# Patient Record
Sex: FEMALE | Race: WHITE | NOT HISPANIC OR LATINO | Employment: OTHER | URBAN - NONMETROPOLITAN AREA
[De-identification: names, ages, dates, MRNs, and addresses within clinical notes are randomized per-mention and may not be internally consistent; named-entity substitution may affect disease eponyms.]

---

## 2019-01-03 ENCOUNTER — HOSPITAL ENCOUNTER (EMERGENCY)
Facility: HOSPITAL | Age: 74
Discharge: ANOTHER HOSPITAL | End: 2019-01-04
Attending: FAMILY MEDICINE | Admitting: FAMILY MEDICINE

## 2019-01-03 DIAGNOSIS — D50.0 ANEMIA DUE TO GASTROINTESTINAL BLOOD LOSS: Primary | ICD-10-CM

## 2019-01-03 PROCEDURE — 85610 PROTHROMBIN TIME: CPT | Performed by: FAMILY MEDICINE

## 2019-01-03 PROCEDURE — 85730 THROMBOPLASTIN TIME PARTIAL: CPT | Performed by: FAMILY MEDICINE

## 2019-01-03 PROCEDURE — 99284 EMERGENCY DEPT VISIT MOD MDM: CPT

## 2019-01-03 PROCEDURE — 86900 BLOOD TYPING SEROLOGIC ABO: CPT | Performed by: FAMILY MEDICINE

## 2019-01-03 PROCEDURE — 86850 RBC ANTIBODY SCREEN: CPT | Performed by: FAMILY MEDICINE

## 2019-01-03 PROCEDURE — 86901 BLOOD TYPING SEROLOGIC RH(D): CPT | Performed by: FAMILY MEDICINE

## 2019-01-03 PROCEDURE — 96365 THER/PROPH/DIAG IV INF INIT: CPT

## 2019-01-03 PROCEDURE — 86923 COMPATIBILITY TEST ELECTRIC: CPT

## 2019-01-03 PROCEDURE — 80053 COMPREHEN METABOLIC PANEL: CPT | Performed by: FAMILY MEDICINE

## 2019-01-03 RX ORDER — SODIUM CHLORIDE 0.9 % (FLUSH) 0.9 %
10 SYRINGE (ML) INJECTION AS NEEDED
Status: DISCONTINUED | OUTPATIENT
Start: 2019-01-03 | End: 2019-01-04 | Stop reason: HOSPADM

## 2019-01-04 ENCOUNTER — APPOINTMENT (OUTPATIENT)
Dept: GENERAL RADIOLOGY | Facility: HOSPITAL | Age: 74
End: 2019-01-04

## 2019-01-04 VITALS
BODY MASS INDEX: 25.39 KG/M2 | HEIGHT: 66 IN | WEIGHT: 158 LBS | OXYGEN SATURATION: 98 % | HEART RATE: 88 BPM | TEMPERATURE: 99.1 F | RESPIRATION RATE: 18 BRPM | SYSTOLIC BLOOD PRESSURE: 145 MMHG | DIASTOLIC BLOOD PRESSURE: 59 MMHG

## 2019-01-04 PROBLEM — D50.0 ANEMIA DUE TO GASTROINTESTINAL BLOOD LOSS: Status: ACTIVE | Noted: 2019-01-04

## 2019-01-04 LAB
ABO GROUP BLD: NORMAL
ABO GROUP BLD: NORMAL
ALBUMIN SERPL-MCNC: 4.2 G/DL (ref 3.4–4.8)
ALBUMIN/GLOB SERPL: 1.7 G/DL (ref 1.1–1.8)
ALP SERPL-CCNC: 86 U/L (ref 38–126)
ALT SERPL W P-5'-P-CCNC: 21 U/L (ref 9–52)
ANION GAP SERPL CALCULATED.3IONS-SCNC: 14 MMOL/L (ref 5–15)
ANISOCYTOSIS BLD QL: ABNORMAL
APTT PPP: 26.1 SECONDS (ref 20–40.3)
AST SERPL-CCNC: 24 U/L (ref 14–36)
BILIRUB SERPL-MCNC: 0.4 MG/DL (ref 0.2–1.3)
BLD GP AB SCN SERPL QL: NEGATIVE
BUN BLD-MCNC: 20 MG/DL (ref 7–21)
BUN/CREAT SERPL: 18.7 (ref 7–25)
CALCIUM SPEC-SCNC: 9.1 MG/DL (ref 8.4–10.2)
CHLORIDE SERPL-SCNC: 103 MMOL/L (ref 95–110)
CO2 SERPL-SCNC: 18 MMOL/L (ref 22–31)
CREAT BLD-MCNC: 1.07 MG/DL (ref 0.5–1)
DEPRECATED RDW RBC AUTO: 45.8 FL (ref 36.4–46.3)
EOSINOPHIL # BLD MANUAL: 0.26 10*3/MM3 (ref 0–0.7)
EOSINOPHIL NFR BLD MANUAL: 3 % (ref 0–7)
ERYTHROCYTE [DISTWIDTH] IN BLOOD BY AUTOMATED COUNT: 14.1 % (ref 11.5–14.5)
GFR SERPL CREATININE-BSD FRML MDRD: 51 ML/MIN/1.73 (ref 39–90)
GLOBULIN UR ELPH-MCNC: 2.5 GM/DL (ref 2.3–3.5)
GLUCOSE BLD-MCNC: 158 MG/DL (ref 60–100)
HCT VFR BLD AUTO: 16.1 % (ref 35–45)
HGB BLD-MCNC: 5.2 G/DL (ref 12–15.5)
HOLD SPECIMEN: NORMAL
HOLD SPECIMEN: NORMAL
INR PPP: 1.02 (ref 0.8–1.2)
LYMPHOCYTES # BLD MANUAL: 1.13 10*3/MM3 (ref 0.6–4.2)
LYMPHOCYTES NFR BLD MANUAL: 13 % (ref 10–50)
LYMPHOCYTES NFR BLD MANUAL: 2 % (ref 0–12)
Lab: NORMAL
MCH RBC QN AUTO: 28.7 PG (ref 26.5–34)
MCHC RBC AUTO-ENTMCNC: 32.3 G/DL (ref 31.4–36)
MCV RBC AUTO: 89 FL (ref 80–98)
MONOCYTES # BLD AUTO: 0.17 10*3/MM3 (ref 0–0.9)
NEUTROPHILS # BLD AUTO: 7.16 10*3/MM3 (ref 2–8.6)
NEUTROPHILS NFR BLD MANUAL: 82 % (ref 37–80)
PLATELET # BLD AUTO: 258 10*3/MM3 (ref 150–450)
PMV BLD AUTO: 8.8 FL (ref 8–12)
POLYCHROMASIA BLD QL SMEAR: ABNORMAL
POTASSIUM BLD-SCNC: 4.9 MMOL/L (ref 3.5–5.1)
PROT SERPL-MCNC: 6.7 G/DL (ref 6.3–8.6)
PROTHROMBIN TIME: 13.2 SECONDS (ref 11.1–15.3)
RBC # BLD AUTO: 1.81 10*6/MM3 (ref 3.77–5.16)
RH BLD: POSITIVE
RH BLD: POSITIVE
SMALL PLATELETS BLD QL SMEAR: ADEQUATE
SODIUM BLD-SCNC: 135 MMOL/L (ref 137–145)
T&S EXPIRATION DATE: NORMAL
TARGETS BLD QL SMEAR: ABNORMAL
WBC MORPH BLD: NORMAL
WBC NRBC COR # BLD: 8.73 10*3/MM3 (ref 3.2–9.8)
WHOLE BLOOD HOLD SPECIMEN: NORMAL
WHOLE BLOOD HOLD SPECIMEN: NORMAL

## 2019-01-04 PROCEDURE — 96365 THER/PROPH/DIAG IV INF INIT: CPT

## 2019-01-04 PROCEDURE — 85007 BL SMEAR W/DIFF WBC COUNT: CPT | Performed by: FAMILY MEDICINE

## 2019-01-04 PROCEDURE — 85025 COMPLETE CBC W/AUTO DIFF WBC: CPT | Performed by: FAMILY MEDICINE

## 2019-01-04 PROCEDURE — 36415 COLL VENOUS BLD VENIPUNCTURE: CPT | Performed by: FAMILY MEDICINE

## 2019-01-04 PROCEDURE — 86900 BLOOD TYPING SEROLOGIC ABO: CPT

## 2019-01-04 PROCEDURE — 86901 BLOOD TYPING SEROLOGIC RH(D): CPT

## 2019-01-04 PROCEDURE — 36430 TRANSFUSION BLD/BLD COMPNT: CPT

## 2019-01-04 PROCEDURE — P9016 RBC LEUKOCYTES REDUCED: HCPCS

## 2019-01-04 RX ORDER — CITALOPRAM 40 MG/1
1 TABLET ORAL DAILY
COMMUNITY
Start: 2017-11-08

## 2019-01-04 RX ORDER — FERROUS SULFATE TAB EC 324 MG (65 MG FE EQUIVALENT) 324 (65 FE) MG
325 TABLET DELAYED RESPONSE ORAL DAILY
COMMUNITY
Start: 2019-01-03 | End: 2020-01-04

## 2019-01-04 RX ORDER — BENAZEPRIL HYDROCHLORIDE 40 MG/1
1 TABLET, FILM COATED ORAL EVERY 12 HOURS
COMMUNITY
Start: 2018-11-20 | End: 2022-06-01 | Stop reason: HOSPADM

## 2019-01-04 RX ORDER — PANTOPRAZOLE SODIUM 40 MG/10ML
80 INJECTION, POWDER, LYOPHILIZED, FOR SOLUTION INTRAVENOUS ONCE
Status: COMPLETED | OUTPATIENT
Start: 2019-01-04 | End: 2019-01-04

## 2019-01-04 RX ORDER — ACETAMINOPHEN 500 MG
TABLET ORAL
Status: COMPLETED
Start: 2019-01-04 | End: 2019-01-04

## 2019-01-04 RX ORDER — SIMVASTATIN 80 MG
80 TABLET ORAL DAILY
COMMUNITY
Start: 2013-11-15 | End: 2022-06-01 | Stop reason: HOSPADM

## 2019-01-04 RX ORDER — ALENDRONATE SODIUM 70 MG/1
70 TABLET ORAL WEEKLY
COMMUNITY
Start: 2018-12-19

## 2019-01-04 RX ORDER — SENNOSIDES 8.6 MG
650 CAPSULE ORAL 3 TIMES DAILY PRN
COMMUNITY

## 2019-01-04 RX ADMIN — SODIUM CHLORIDE 8 MG/HR: 900 INJECTION INTRAVENOUS at 03:14

## 2019-01-04 RX ADMIN — ACETAMINOPHEN 1000 MG: 500 TABLET ORAL at 02:09

## 2019-01-04 RX ADMIN — PANTOPRAZOLE SODIUM 80 MG: 40 INJECTION, POWDER, FOR SOLUTION INTRAVENOUS at 03:02

## 2019-01-04 NOTE — ED PROVIDER NOTES
Subjective   73-year-old white female presents to emergency department with a complaint of feeling fatigued and tired for the last couple of weeks.  She states that she felt pretty good prior to this.  Her doctor ordered labs which she had done today.  Her doctor called her and told her that she needed to come to the emergency department because her blood counts were low.  She admits to history of some kind of gastrointestinal ulcer several years ago.  She had a colonoscopy in 2013 by Dr. Ni.  She states that she has been doing well since then.            Review of Systems   Constitutional: Positive for fatigue. Negative for activity change, appetite change, chills, fever and unexpected weight change.   HENT: Negative for nosebleeds, rhinorrhea, sore throat, trouble swallowing and voice change.    Eyes: Negative for photophobia, pain and visual disturbance.   Respiratory: Negative for apnea, cough, chest tightness, shortness of breath, wheezing and stridor.    Cardiovascular: Negative for chest pain, palpitations and leg swelling.   Gastrointestinal: Negative for abdominal distention, abdominal pain, blood in stool, constipation, diarrhea, nausea and vomiting.   Endocrine: Negative for cold intolerance, heat intolerance, polydipsia and polyuria.   Genitourinary: Negative for decreased urine volume, difficulty urinating, dysuria, flank pain, hematuria and urgency.   Musculoskeletal: Negative for arthralgias, myalgias, neck pain and neck stiffness.   Skin: Negative for color change, pallor and rash.   Allergic/Immunologic: Negative for immunocompromised state.   Neurological: Negative for dizziness, seizures, syncope, weakness, light-headedness and numbness.   Hematological: Negative for adenopathy.   Psychiatric/Behavioral: Negative for agitation, confusion, dysphoric mood and suicidal ideas. The patient is not nervous/anxious.        Past Medical History:   Diagnosis Date   • Anemia    • Diabetes mellitus  (CMS/[facility])    • Hyperlipidemia    • Hypertension        No Known Allergies    History reviewed. No pertinent surgical history.    History reviewed. No pertinent family history.    Social History     Socioeconomic History   • Marital status:      Spouse name: Not on file   • Number of children: Not on file   • Years of education: Not on file   • Highest education level: Not on file   Tobacco Use   • Smoking status: Former Smoker   • Smokeless tobacco: Never Used   Substance and Sexual Activity   • Alcohol use: No     Frequency: Never   • Drug use: No   • Sexual activity: Defer           Objective   Physical Exam   Constitutional: She is oriented to person, place, and time. She appears well-developed and well-nourished. No distress.   HENT:   Head: Normocephalic and atraumatic.   Right Ear: External ear normal.   Left Ear: External ear normal.   Mouth/Throat: Oropharynx is clear and moist. No oropharyngeal exudate.   Eyes: Conjunctivae and EOM are normal. Pupils are equal, round, and reactive to light. Right eye exhibits no discharge. Left eye exhibits no discharge. No scleral icterus.   Neck: Neck supple. No JVD present. No tracheal deviation present. No thyromegaly present.   Cardiovascular: Normal rate, regular rhythm, normal heart sounds and intact distal pulses. Exam reveals no friction rub.   No murmur heard.  Pulmonary/Chest: Effort normal and breath sounds normal. No stridor. No respiratory distress. She has no wheezes. She has no rales. She exhibits no tenderness.   Abdominal: Soft. Bowel sounds are normal. She exhibits no distension and no mass. There is no tenderness. There is no rebound and no guarding.   Genitourinary: Rectal exam shows guaiac positive stool.   Musculoskeletal: She exhibits no edema, tenderness or deformity.   Lymphadenopathy:     She has no cervical adenopathy.   Neurological: She is alert and oriented to person, place, and time. No cranial nerve deficit. She exhibits normal muscle  tone. Coordination normal.   Skin: Skin is warm and dry. Capillary refill takes 2 to 3 seconds. No rash noted. She is not diaphoretic. No erythema.   Psychiatric: She has a normal mood and affect. Her behavior is normal. Judgment and thought content normal.   Nursing note and vitals reviewed.      Procedures           ED Course  ED Course as of Jan 04 0238 Fri Jan 04, 2019   0123 Pt placed in Rm 9 and evaluated by me. PE was unremarkable except guiac+ stools. H/H was low and 2 units PRBCs were started in the Er. No GI on call. Pt requests to go to [facility]. Case d/w Dr Hannon who agreed to accept the patient.   [CE]      ED Course User Index  [CE] Pino Brooks,       Labs Reviewed   COMPREHENSIVE METABOLIC PANEL - Abnormal; Notable for the following components:       Result Value    Glucose 158 (*)     Creatinine 1.07 (*)     Sodium 135 (*)     CO2 18.0 (*)     All other components within normal limits    Narrative:     The MDRD GFR formula is only valid for adults with stable renal function between ages 18 and 70.   CBC WITH AUTO DIFFERENTIAL - Abnormal; Notable for the following components:    RBC 1.81 (*)     Hemoglobin 5.2 (*)     Hematocrit 16.1 (*)     All other components within normal limits    Narrative:     Recollected to confirm results.   MANUAL DIFFERENTIAL - Abnormal; Notable for the following components:    Neutrophil % 82.0 (*)     All other components within normal limits   PROTIME-INR - Normal    Narrative:     Therapeutic range for most indications is 2.0-3.0 INR,  or 2.5-3.5 for mechanical heart valves.   APTT - Normal    Narrative:     The recommended Heparin therapeutic range is 68-97 seconds.   RAINBOW DRAW    Narrative:     The following orders were created for panel order [place] Draw.  Procedure                               Abnormality         Status                     ---------                               -----------         ------                     Light Blue  Top[[id number]]                                   Final result               Green Top (Gel)[[id number]]                                  Final result               Lavender Top[[id number]]                                     Final result               Gold Top - SST[[id number]]                                   Final result                 Please view results for these tests on the individual orders.   TYPE AND SCREEN   PREVIOUS HISTORY   ABORH 2ND SPECIMEN VERIFICATION   PREPARE RBC   CBC AND DIFFERENTIAL    Narrative:     The following orders were created for panel order CBC & Differential.  Procedure                               Abnormality         Status                     ---------                               -----------         ------                     Manual Differential[[id number]]          Abnormal            Final result               CBC Auto Differential[[id number]]        Abnormal            Final result                 Please view results for these tests on the individual orders.   LIGHT BLUE TOP   GREEN TOP   LAVENDER TOP   GOLD TOP - SST     No results found.            MDM      Final diagnoses:   Anemia due to gastrointestinal blood loss            Pino Brooks, DO  01/04/19 0130       Pino Brooks, DO  01/04/19 0238

## 2019-01-08 LAB
ABO + RH BLD: NORMAL
ABO + RH BLD: NORMAL
BH BB BLOOD EXPIRATION DATE: NORMAL
BH BB BLOOD EXPIRATION DATE: NORMAL
BH BB BLOOD TYPE BARCODE: 5100
BH BB BLOOD TYPE BARCODE: 5100
BH BB DISPENSE STATUS: NORMAL
BH BB DISPENSE STATUS: NORMAL
BH BB PRODUCT CODE: NORMAL
BH BB PRODUCT CODE: NORMAL
BH BB UNIT NUMBER: NORMAL
BH BB UNIT NUMBER: NORMAL
UNIT  ABO: NORMAL
UNIT  ABO: NORMAL
UNIT  RH: NORMAL
UNIT  RH: NORMAL

## 2019-01-17 ENCOUNTER — ANESTHESIA (OUTPATIENT)
Dept: GASTROENTEROLOGY | Facility: HOSPITAL | Age: 74
End: 2019-01-17

## 2019-01-17 ENCOUNTER — ANESTHESIA EVENT (OUTPATIENT)
Dept: GASTROENTEROLOGY | Facility: HOSPITAL | Age: 74
End: 2019-01-17

## 2019-01-17 ENCOUNTER — HOSPITAL ENCOUNTER (OUTPATIENT)
Facility: HOSPITAL | Age: 74
Setting detail: HOSPITAL OUTPATIENT SURGERY
Discharge: HOME | End: 2019-01-17
Attending: INTERNAL MEDICINE | Admitting: INTERNAL MEDICINE

## 2019-01-17 VITALS
BODY MASS INDEX: 25.39 KG/M2 | DIASTOLIC BLOOD PRESSURE: 50 MMHG | HEART RATE: 61 BPM | RESPIRATION RATE: 18 BRPM | WEIGHT: 158 LBS | SYSTOLIC BLOOD PRESSURE: 110 MMHG | OXYGEN SATURATION: 95 % | TEMPERATURE: 98.5 F | HEIGHT: 66 IN

## 2019-01-17 DIAGNOSIS — D50.0 BLOOD LOSS ANEMIA: ICD-10-CM

## 2019-01-17 LAB — GLUCOSE BLDC GLUCOMTR-MCNC: 154 MG/DL (ref 70–130)

## 2019-01-17 PROCEDURE — 25010000002 PROPOFOL 10 MG/ML EMULSION: Performed by: NURSE ANESTHETIST, CERTIFIED REGISTERED

## 2019-01-17 PROCEDURE — 88305 TISSUE EXAM BY PATHOLOGIST: CPT | Performed by: INTERNAL MEDICINE

## 2019-01-17 PROCEDURE — 82962 GLUCOSE BLOOD TEST: CPT

## 2019-01-17 PROCEDURE — 88305 TISSUE EXAM BY PATHOLOGIST: CPT | Performed by: PATHOLOGY

## 2019-01-17 PROCEDURE — 25010000002 FENTANYL CITRATE (PF) 100 MCG/2ML SOLUTION: Performed by: NURSE ANESTHETIST, CERTIFIED REGISTERED

## 2019-01-17 RX ORDER — DEXTROSE AND SODIUM CHLORIDE 5; .45 G/100ML; G/100ML
30 INJECTION, SOLUTION INTRAVENOUS CONTINUOUS
Status: DISCONTINUED | OUTPATIENT
Start: 2019-01-17 | End: 2019-01-17 | Stop reason: HOSPADM

## 2019-01-17 RX ORDER — PROPOFOL 10 MG/ML
VIAL (ML) INTRAVENOUS AS NEEDED
Status: DISCONTINUED | OUTPATIENT
Start: 2019-01-17 | End: 2019-01-17 | Stop reason: SURG

## 2019-01-17 RX ORDER — FENTANYL CITRATE 50 UG/ML
INJECTION, SOLUTION INTRAMUSCULAR; INTRAVENOUS AS NEEDED
Status: DISCONTINUED | OUTPATIENT
Start: 2019-01-17 | End: 2019-01-17 | Stop reason: SURG

## 2019-01-17 RX ADMIN — DEXTROSE AND SODIUM CHLORIDE 30 ML/HR: 5; 450 INJECTION, SOLUTION INTRAVENOUS at 08:51

## 2019-01-17 RX ADMIN — FENTANYL CITRATE 50 MCG: 50 INJECTION, SOLUTION INTRAMUSCULAR; INTRAVENOUS at 10:13

## 2019-01-17 RX ADMIN — PROPOFOL 30 MG: 10 INJECTION, EMULSION INTRAVENOUS at 10:13

## 2019-01-17 RX ADMIN — PROPOFOL 20 MG: 10 INJECTION, EMULSION INTRAVENOUS at 10:08

## 2019-01-17 RX ADMIN — FENTANYL CITRATE 50 MCG: 50 INJECTION, SOLUTION INTRAMUSCULAR; INTRAVENOUS at 10:02

## 2019-01-17 RX ADMIN — PROPOFOL 20 MG: 10 INJECTION, EMULSION INTRAVENOUS at 10:05

## 2019-01-17 RX ADMIN — PROPOFOL 60 MG: 10 INJECTION, EMULSION INTRAVENOUS at 10:03

## 2019-01-17 NOTE — H&P
Evelyn Gallagher DO,[facility]  Gastroenterology  Hepatology  Endoscopy  Board Certified in Internal Medicine and gastroenterology  [address], [address]  [place]. [address]  - [phone]   F - [phone]     GASTROENTEROLOGY HISTORY AND PHYSICAL  NOTE   EVELYN GALLAGHER DO.         SUBJECTIVE:   1/17/2019    Name: Shivani Galvez  DOD: 1945        Chief Complaint:       Subjective : Screening for colon cancer.  Iron deficiency anemia.  EGD and small bowel capsule normal apparently.    Patient is 73 y.o. female presents with desire for elective colonoscopy.      ROS/HISTORY/ CURRENT MEDICATIONS/OBJECTIVE/VS/PE:   Review of Systems:  All systems unremarkable unless specified below.  Constitutional   HENT  Eyes   Respiratory    Cardiovascular  Gastrointestinal   Endocrine  Genitourinary    Musculoskeletal   Skin  Allergic/Immunologic    Neurological    Hematological  Psychiatric/Behavioral    History:     Past Medical History:   Diagnosis Date   • Anemia    • Diabetes mellitus (CMS/HCC)    • History of transfusion    • Hyperlipidemia    • Hypertension    • TIA (transient ischemic attack)      Past Surgical History:   Procedure Laterality Date   • TUBAL ABDOMINAL LIGATION       History reviewed. No pertinent family history.  Social History     Tobacco Use   • Smoking status: Former Smoker   • Smokeless tobacco: Never Used   Substance Use Topics   • Alcohol use: No     Frequency: Never   • Drug use: No     Medications Prior to Admission   Medication Sig Dispense Refill Last Dose   • acetaminophen (TYLENOL) 650 MG 8 hr tablet Take 650 mg by mouth 3 (Three) Times a Day As Needed.   1/16/2019 at Unknown time   • benazepril (LOTENSIN) 40 MG tablet Take 1 tablet by mouth Every 12 (Twelve) Hours.   1/16/2019 at Unknown time   • calcium citrate-vitamin D (CITRACAL+D) 315-200 MG-UNIT per tablet Take 1 tablet by mouth 2 (Two) Times a Day.   1/16/2019 at Unknown time   • citalopram (CeleXA) 40 MG tablet  Take 1 tablet by mouth Daily.   1/16/2019 at Unknown time   • ferrous sulfate 324 (65 Fe) MG tablet delayed-release EC tablet Take 325 mg by mouth Daily.   1/16/2019 at Unknown time   • Lancet Devices (ACCU-CHEK SOFTCLIX) lancets 1 Units.   1/16/2019 at Unknown time   • metFORMIN (GLUCOPHAGE) 500 MG tablet Take 1,000 mg by mouth 2 (Two) Times a Day.   1/16/2019 at Unknown time   • simvastatin (ZOCOR) 80 MG tablet Take 80 mg by mouth Daily.   1/16/2019 at Unknown time   • SITagliptin (JANUVIA) 100 MG tablet Take 100 mg by mouth Daily.   1/16/2019 at Unknown time   • alendronate (FOSAMAX) 70 MG tablet Take 70 mg by mouth 1 (One) Time Per Week.   1/12/2019     Allergies:  Patient has no known allergies.    I have reviewed the patients medical history, surgical history and family history in the available medical record system.     Current Medications:     Current Facility-Administered Medications   Medication Dose Route Frequency Provider Last Rate Last Dose   • dextrose 5 % and sodium chloride 0.45 % infusion  30 mL/hr Intravenous Continuous Lucio Ni DO 30 mL/hr at 01/17/19 0851 30 mL/hr at 01/17/19 0851       Objective     Physical Exam:   Temp:  [97.3 °F (36.3 °C)] 97.3 °F (36.3 °C)  Heart Rate:  [75] 75  Resp:  [18] 18    Physical Exam:  General Appearance:    Alert, cooperative, in no acute distress   Head:    Normocephalic, without obvious abnormality, atraumatic   Eyes:            Lids and lashes normal, conjunctivae and sclerae normal, no   icterus, no pallor, corneas clear, PERRLA   Ears:    Ears appear intact with no abnormalities noted   Throat:   No oral lesions, no thrush, oral mucosa moist   Neck:   No adenopathy, supple, trachea midline, no thyromegaly, no     carotid bruit, no JVD   Back:     No kyphosis present, no scoliosis present, no skin lesions,       erythema or scars, no tenderness to percussion or                   palpation,   range of motion normal   Lungs:     Clear to  auscultation,respirations regular, even and                   unlabored    Heart:    Regular rhythm and normal rate, normal S1 and S2, no            murmur, no gallop, no rub, no click   Breast Exam:    Deferred   Abdomen:     Normal bowel sounds, no masses, no organomegaly, soft        non-tender, non-distended, no guarding, no rebound                 tenderness   Genitalia:    Deferred   Extremities:   Moves all extremities well, no edema, no cyanosis, no              redness   Pulses:   Pulses palpable and equal bilaterally   Skin:   No bleeding, bruising or rash   Lymph nodes:   No palpable adenopathy   Neurologic:   Cranial nerves 2 - 12 grossly intact, sensation intact, DTR        present and equal bilaterally      Results Review:     Lab Results   Component Value Date    WBC 8.73 01/04/2019    HGB 5.2 (C) 01/04/2019    HCT 16.1 (L) 01/04/2019     01/04/2019             No results found for: LIPASE  Lab Results   Component Value Date    INR 1.02 01/03/2019          Radiology Review:  Imaging Results (last 72 hours)     ** No results found for the last 72 hours. **           I reviewed the patient's new clinical results.  I reviewed the patient's new imaging results and agree with the interpretation.     ASSESSMENT/PLAN:   ASSESSMENT:   1.  Screening for colon cancer  2.  Iron deficiency anemia    PLAN:   1.  Colonoscopy    Risk and benefits associated with the procedure are reviewed with the patient.  The patient wishes to proceed      Lucio Ni DO  01/17/19  9:38 AM

## 2019-01-17 NOTE — ANESTHESIA POSTPROCEDURE EVALUATION
Patient: Shivani Galvez    Procedure Summary     Date:  01/17/19 Room / Location:  [facility] ENDOSCOPY 2 / [facility] ENDOSCOPY    Anesthesia Start:  0959 Anesthesia Stop:  1019    Procedure:  COLONOSCOPY (N/A ) Diagnosis:       Blood loss anemia      (Blood loss anemia [D50.0])    Surgeon:  Lucio Ni DO Provider:  Demar Carrera CRNA    Anesthesia Type:  MAC ASA Status:  3          Anesthesia Type: MAC  Last vitals  BP       Temp   97.3 °F (36.3 °C) (01/17/19 0838)   Pulse   75 (01/17/19 0838)   Resp   18 (01/17/19 0838)     SpO2   99 % (01/17/19 0838)     Post Anesthesia Care and Evaluation    Patient location during evaluation: bedside  Patient participation: complete - patient participated  Level of consciousness: awake and awake and alert  Pain score: 0  Pain management: satisfactory to patient  Airway patency: patent  Anesthetic complications: No anesthetic complications  PONV Status: none  Cardiovascular status: acceptable and stable  Respiratory status: acceptable, room air and spontaneous ventilation  Hydration status: acceptable

## 2019-01-17 NOTE — ANESTHESIA PREPROCEDURE EVALUATION
Anesthesia Evaluation     Patient summary reviewed and Nursing notes reviewed   NPO Solid Status: > 8 hours  NPO Liquid Status: > 2 hours           Airway   Mallampati: II  TM distance: >3 FB  Neck ROM: full  No difficulty expected  Dental    (+) upper dentures and lower dentures    Pulmonary - normal exam   (+) a smoker Former,   Cardiovascular - normal exam    (+) hypertension, hyperlipidemia,       Neuro/Psych  (+) TIA,     GI/Hepatic/Renal/Endo    (+)  GI bleeding, diabetes mellitus,     Musculoskeletal (-) negative ROS    Abdominal  - normal exam   Substance History - negative use     OB/GYN negative ob/gyn ROS   (-)  Pregnant        Other          Other Comment: Anemia, last reported HGB 5.2                Anesthesia Plan    ASA 3     MAC     intravenous induction   Anesthetic plan, all risks, benefits, and alternatives have been provided, discussed and informed consent has been obtained with: patient.

## 2019-01-18 LAB
LAB AP CASE REPORT: NORMAL
PATH REPORT.FINAL DX SPEC: NORMAL
PATH REPORT.GROSS SPEC: NORMAL

## 2021-01-13 ENCOUNTER — IMMUNIZATION (OUTPATIENT)
Dept: VACCINE CLINIC | Facility: HOSPITAL | Age: 76
End: 2021-01-13

## 2021-01-13 PROCEDURE — 0001A: CPT | Performed by: THORACIC SURGERY (CARDIOTHORACIC VASCULAR SURGERY)

## 2021-01-13 PROCEDURE — 91300 HC SARSCOV02 VAC 30MCG/0.3ML IM: CPT | Performed by: THORACIC SURGERY (CARDIOTHORACIC VASCULAR SURGERY)

## 2021-02-03 ENCOUNTER — IMMUNIZATION (OUTPATIENT)
Dept: VACCINE CLINIC | Facility: HOSPITAL | Age: 76
End: 2021-02-03

## 2021-02-03 PROCEDURE — 91300 HC SARSCOV02 VAC 30MCG/0.3ML IM: CPT | Performed by: THORACIC SURGERY (CARDIOTHORACIC VASCULAR SURGERY)

## 2021-02-03 PROCEDURE — 0002A: CPT | Performed by: THORACIC SURGERY (CARDIOTHORACIC VASCULAR SURGERY)

## 2022-05-27 ENCOUNTER — TRANSCRIBE ORDERS (OUTPATIENT)
Dept: ORTHOPEDIC SURGERY | Facility: CLINIC | Age: 77
End: 2022-05-27

## 2022-05-27 DIAGNOSIS — M25.552 LEFT HIP PAIN: Primary | ICD-10-CM

## 2022-05-27 DIAGNOSIS — M25.562 ACUTE PAIN OF LEFT KNEE: ICD-10-CM

## 2022-05-30 ENCOUNTER — HOSPITAL ENCOUNTER (OUTPATIENT)
Facility: HOSPITAL | Age: 77
Setting detail: OBSERVATION
Discharge: HOME | End: 2022-06-01
Attending: FAMILY MEDICINE | Admitting: INTERNAL MEDICINE

## 2022-05-30 ENCOUNTER — APPOINTMENT (OUTPATIENT)
Dept: GENERAL RADIOLOGY | Facility: HOSPITAL | Age: 77
End: 2022-05-30

## 2022-05-30 ENCOUNTER — APPOINTMENT (OUTPATIENT)
Dept: CT IMAGING | Facility: HOSPITAL | Age: 77
End: 2022-05-30

## 2022-05-30 DIAGNOSIS — I48.91 ATRIAL FIBRILLATION, UNSPECIFIED TYPE: ICD-10-CM

## 2022-05-30 DIAGNOSIS — Z78.9 IMPAIRED MOBILITY AND ACTIVITIES OF DAILY LIVING: ICD-10-CM

## 2022-05-30 DIAGNOSIS — I10 BENIGN HYPERTENSION: ICD-10-CM

## 2022-05-30 DIAGNOSIS — I48.91 ATRIAL FIBRILLATION WITH RVR: Primary | ICD-10-CM

## 2022-05-30 DIAGNOSIS — Z74.09 IMPAIRED MOBILITY AND ACTIVITIES OF DAILY LIVING: ICD-10-CM

## 2022-05-30 DIAGNOSIS — S76.012A STRAIN OF LEFT HIP, INITIAL ENCOUNTER: ICD-10-CM

## 2022-05-30 DIAGNOSIS — M25.552 LEFT HIP PAIN: ICD-10-CM

## 2022-05-30 DIAGNOSIS — I11.9 BENIGN HYPERTENSIVE HEART DISEASE WITHOUT HEART FAILURE: ICD-10-CM

## 2022-05-30 LAB
ALBUMIN SERPL-MCNC: 4.5 G/DL (ref 3.5–5.2)
ALBUMIN/GLOB SERPL: 1.4 G/DL
ALP SERPL-CCNC: 76 U/L (ref 39–117)
ALT SERPL W P-5'-P-CCNC: 16 U/L (ref 1–33)
ANION GAP SERPL CALCULATED.3IONS-SCNC: 17 MMOL/L (ref 5–15)
AST SERPL-CCNC: 18 U/L (ref 1–32)
BASOPHILS # BLD AUTO: 0.11 10*3/MM3 (ref 0–0.2)
BASOPHILS NFR BLD AUTO: 0.9 % (ref 0–1.5)
BILIRUB SERPL-MCNC: 0.5 MG/DL (ref 0–1.2)
BUN SERPL-MCNC: 27 MG/DL (ref 8–23)
BUN/CREAT SERPL: 21.4 (ref 7–25)
CALCIUM SPEC-SCNC: 10.4 MG/DL (ref 8.6–10.5)
CHLORIDE SERPL-SCNC: 99 MMOL/L (ref 98–107)
CK SERPL-CCNC: 64 U/L (ref 20–180)
CO2 SERPL-SCNC: 15 MMOL/L (ref 22–29)
CREAT SERPL-MCNC: 1.26 MG/DL (ref 0.57–1)
DEPRECATED RDW RBC AUTO: 58.7 FL (ref 37–54)
EGFRCR SERPLBLD CKD-EPI 2021: 44.6 ML/MIN/1.73
EOSINOPHIL # BLD AUTO: 0.06 10*3/MM3 (ref 0–0.4)
EOSINOPHIL NFR BLD AUTO: 0.5 % (ref 0.3–6.2)
ERYTHROCYTE [DISTWIDTH] IN BLOOD BY AUTOMATED COUNT: 22.7 % (ref 12.3–15.4)
FLUAV SUBTYP SPEC NAA+PROBE: NOT DETECTED
FLUBV RNA ISLT QL NAA+PROBE: NOT DETECTED
GLOBULIN UR ELPH-MCNC: 3.3 GM/DL
GLUCOSE SERPL-MCNC: 280 MG/DL (ref 65–99)
HCT VFR BLD AUTO: 39.4 % (ref 34–46.6)
HGB BLD-MCNC: 11.8 G/DL (ref 12–15.9)
HOLD SPECIMEN: NORMAL
IMM GRANULOCYTES # BLD AUTO: 0.09 10*3/MM3 (ref 0–0.05)
IMM GRANULOCYTES NFR BLD AUTO: 0.7 % (ref 0–0.5)
INR PPP: 0.99 (ref 0.8–1.2)
LYMPHOCYTES # BLD AUTO: 2.62 10*3/MM3 (ref 0.7–3.1)
LYMPHOCYTES NFR BLD AUTO: 20.9 % (ref 19.6–45.3)
MAGNESIUM SERPL-MCNC: 1.8 MG/DL (ref 1.6–2.4)
MCH RBC QN AUTO: 22.4 PG (ref 26.6–33)
MCHC RBC AUTO-ENTMCNC: 29.9 G/DL (ref 31.5–35.7)
MCV RBC AUTO: 74.8 FL (ref 79–97)
MONOCYTES # BLD AUTO: 0.69 10*3/MM3 (ref 0.1–0.9)
MONOCYTES NFR BLD AUTO: 5.5 % (ref 5–12)
NEUTROPHILS NFR BLD AUTO: 71.5 % (ref 42.7–76)
NEUTROPHILS NFR BLD AUTO: 8.97 10*3/MM3 (ref 1.7–7)
NRBC BLD AUTO-RTO: 0 /100 WBC (ref 0–0.2)
NT-PROBNP SERPL-MCNC: 1031 PG/ML (ref 0–1800)
PLATELET # BLD AUTO: 491 10*3/MM3 (ref 140–450)
PMV BLD AUTO: 9.1 FL (ref 6–12)
POTASSIUM SERPL-SCNC: 4.9 MMOL/L (ref 3.5–5.2)
PROT SERPL-MCNC: 7.8 G/DL (ref 6–8.5)
PROTHROMBIN TIME: 12.9 SECONDS (ref 11.1–15.3)
QT INTERVAL: 302 MS
QTC INTERVAL: 464 MS
RBC # BLD AUTO: 5.27 10*6/MM3 (ref 3.77–5.28)
SARS-COV-2 RNA PNL SPEC NAA+PROBE: NOT DETECTED
SODIUM SERPL-SCNC: 131 MMOL/L (ref 136–145)
TROPONIN T SERPL-MCNC: <0.01 NG/ML (ref 0–0.03)
WBC NRBC COR # BLD: 12.54 10*3/MM3 (ref 3.4–10.8)
WHOLE BLOOD HOLD COAG: NORMAL

## 2022-05-30 PROCEDURE — G0378 HOSPITAL OBSERVATION PER HR: HCPCS

## 2022-05-30 PROCEDURE — 63710000001 INSULIN ASPART PER 5 UNITS: Performed by: INTERNAL MEDICINE

## 2022-05-30 PROCEDURE — 25010000002 MORPHINE PER 10 MG: Performed by: INTERNAL MEDICINE

## 2022-05-30 PROCEDURE — 96361 HYDRATE IV INFUSION ADD-ON: CPT

## 2022-05-30 PROCEDURE — 83880 ASSAY OF NATRIURETIC PEPTIDE: CPT | Performed by: FAMILY MEDICINE

## 2022-05-30 PROCEDURE — 96375 TX/PRO/DX INJ NEW DRUG ADDON: CPT

## 2022-05-30 PROCEDURE — 93005 ELECTROCARDIOGRAM TRACING: CPT | Performed by: FAMILY MEDICINE

## 2022-05-30 PROCEDURE — 25010000002 ENOXAPARIN PER 10 MG: Performed by: INTERNAL MEDICINE

## 2022-05-30 PROCEDURE — 96376 TX/PRO/DX INJ SAME DRUG ADON: CPT

## 2022-05-30 PROCEDURE — 93005 ELECTROCARDIOGRAM TRACING: CPT

## 2022-05-30 PROCEDURE — 96372 THER/PROPH/DIAG INJ SC/IM: CPT

## 2022-05-30 PROCEDURE — C9803 HOPD COVID-19 SPEC COLLECT: HCPCS

## 2022-05-30 PROCEDURE — 36415 COLL VENOUS BLD VENIPUNCTURE: CPT

## 2022-05-30 PROCEDURE — 80053 COMPREHEN METABOLIC PANEL: CPT | Performed by: FAMILY MEDICINE

## 2022-05-30 PROCEDURE — 83735 ASSAY OF MAGNESIUM: CPT | Performed by: FAMILY MEDICINE

## 2022-05-30 PROCEDURE — 96366 THER/PROPH/DIAG IV INF ADDON: CPT

## 2022-05-30 PROCEDURE — 82550 ASSAY OF CK (CPK): CPT | Performed by: FAMILY MEDICINE

## 2022-05-30 PROCEDURE — 87636 SARSCOV2 & INF A&B AMP PRB: CPT | Performed by: FAMILY MEDICINE

## 2022-05-30 PROCEDURE — 96365 THER/PROPH/DIAG IV INF INIT: CPT

## 2022-05-30 PROCEDURE — 93010 ELECTROCARDIOGRAM REPORT: CPT | Performed by: INTERNAL MEDICINE

## 2022-05-30 PROCEDURE — 82962 GLUCOSE BLOOD TEST: CPT

## 2022-05-30 PROCEDURE — 84484 ASSAY OF TROPONIN QUANT: CPT | Performed by: FAMILY MEDICINE

## 2022-05-30 PROCEDURE — 99284 EMERGENCY DEPT VISIT MOD MDM: CPT

## 2022-05-30 PROCEDURE — 25010000002 MORPHINE PER 10 MG: Performed by: FAMILY MEDICINE

## 2022-05-30 PROCEDURE — 85610 PROTHROMBIN TIME: CPT | Performed by: FAMILY MEDICINE

## 2022-05-30 PROCEDURE — 71045 X-RAY EXAM CHEST 1 VIEW: CPT

## 2022-05-30 PROCEDURE — 73502 X-RAY EXAM HIP UNI 2-3 VIEWS: CPT

## 2022-05-30 PROCEDURE — 85025 COMPLETE CBC W/AUTO DIFF WBC: CPT | Performed by: FAMILY MEDICINE

## 2022-05-30 RX ORDER — DEXTROSE MONOHYDRATE 25 G/50ML
25 INJECTION, SOLUTION INTRAVENOUS
Status: DISCONTINUED | OUTPATIENT
Start: 2022-05-30 | End: 2022-06-01 | Stop reason: HOSPADM

## 2022-05-30 RX ORDER — INSULIN ASPART 100 [IU]/ML
0-9 INJECTION, SOLUTION INTRAVENOUS; SUBCUTANEOUS
Status: DISCONTINUED | OUTPATIENT
Start: 2022-05-30 | End: 2022-06-01 | Stop reason: HOSPADM

## 2022-05-30 RX ORDER — SODIUM CHLORIDE 9 MG/ML
125 INJECTION, SOLUTION INTRAVENOUS CONTINUOUS
Status: DISCONTINUED | OUTPATIENT
Start: 2022-05-30 | End: 2022-05-31

## 2022-05-30 RX ORDER — ONDANSETRON 2 MG/ML
4 INJECTION INTRAMUSCULAR; INTRAVENOUS EVERY 6 HOURS PRN
Status: DISCONTINUED | OUTPATIENT
Start: 2022-05-30 | End: 2022-06-01 | Stop reason: HOSPADM

## 2022-05-30 RX ORDER — SODIUM CHLORIDE 0.9 % (FLUSH) 0.9 %
10 SYRINGE (ML) INJECTION EVERY 12 HOURS SCHEDULED
Status: DISCONTINUED | OUTPATIENT
Start: 2022-05-30 | End: 2022-06-01 | Stop reason: HOSPADM

## 2022-05-30 RX ORDER — ATORVASTATIN CALCIUM 40 MG/1
40 TABLET, FILM COATED ORAL DAILY
Status: DISCONTINUED | OUTPATIENT
Start: 2022-05-31 | End: 2022-06-01 | Stop reason: HOSPADM

## 2022-05-30 RX ORDER — SODIUM CHLORIDE 0.9 % (FLUSH) 0.9 %
10 SYRINGE (ML) INJECTION AS NEEDED
Status: DISCONTINUED | OUTPATIENT
Start: 2022-05-30 | End: 2022-06-01 | Stop reason: HOSPADM

## 2022-05-30 RX ORDER — NALOXONE HCL 0.4 MG/ML
0.4 VIAL (ML) INJECTION
Status: DISCONTINUED | OUTPATIENT
Start: 2022-05-30 | End: 2022-06-01 | Stop reason: HOSPADM

## 2022-05-30 RX ORDER — MORPHINE SULFATE 2 MG/ML
2 INJECTION, SOLUTION INTRAMUSCULAR; INTRAVENOUS
Status: DISCONTINUED | OUTPATIENT
Start: 2022-05-30 | End: 2022-06-01 | Stop reason: HOSPADM

## 2022-05-30 RX ORDER — DILTIAZEM HYDROCHLORIDE 60 MG/1
60 TABLET, FILM COATED ORAL EVERY 6 HOURS SCHEDULED
Status: DISCONTINUED | OUTPATIENT
Start: 2022-05-30 | End: 2022-06-01

## 2022-05-30 RX ORDER — ENOXAPARIN SODIUM 100 MG/ML
40 INJECTION SUBCUTANEOUS EVERY 24 HOURS
Status: DISCONTINUED | OUTPATIENT
Start: 2022-05-30 | End: 2022-06-01

## 2022-05-30 RX ORDER — NICOTINE POLACRILEX 4 MG
15 LOZENGE BUCCAL
Status: DISCONTINUED | OUTPATIENT
Start: 2022-05-30 | End: 2022-06-01 | Stop reason: HOSPADM

## 2022-05-30 RX ORDER — ACETAMINOPHEN 325 MG/1
650 TABLET ORAL EVERY 4 HOURS PRN
Status: DISCONTINUED | OUTPATIENT
Start: 2022-05-30 | End: 2022-06-01 | Stop reason: HOSPADM

## 2022-05-30 RX ORDER — CITALOPRAM 20 MG/1
20 TABLET ORAL DAILY
Status: DISCONTINUED | OUTPATIENT
Start: 2022-05-31 | End: 2022-06-01 | Stop reason: HOSPADM

## 2022-05-30 RX ADMIN — SODIUM CHLORIDE 500 ML: 9 INJECTION, SOLUTION INTRAVENOUS at 11:39

## 2022-05-30 RX ADMIN — SODIUM CHLORIDE 125 ML/HR: 9 INJECTION, SOLUTION INTRAVENOUS at 11:40

## 2022-05-30 RX ADMIN — MORPHINE SULFATE 2 MG: 2 INJECTION, SOLUTION INTRAMUSCULAR; INTRAVENOUS at 22:09

## 2022-05-30 RX ADMIN — SODIUM CHLORIDE 125 ML/HR: 9 INJECTION, SOLUTION INTRAVENOUS at 17:45

## 2022-05-30 RX ADMIN — MORPHINE SULFATE 4 MG: 4 INJECTION INTRAVENOUS at 12:27

## 2022-05-30 RX ADMIN — INSULIN ASPART 4 UNITS: 100 INJECTION, SOLUTION INTRAVENOUS; SUBCUTANEOUS at 17:02

## 2022-05-30 RX ADMIN — MORPHINE SULFATE 2 MG: 2 INJECTION, SOLUTION INTRAMUSCULAR; INTRAVENOUS at 17:41

## 2022-05-30 RX ADMIN — SODIUM CHLORIDE 5 MG/HR: 900 INJECTION, SOLUTION INTRAVENOUS at 12:25

## 2022-05-30 RX ADMIN — ENOXAPARIN SODIUM 40 MG: 40 INJECTION SUBCUTANEOUS at 17:41

## 2022-05-30 RX ADMIN — DILTIAZEM HYDROCHLORIDE 60 MG: 60 TABLET, FILM COATED ORAL at 17:41

## 2022-05-30 RX ADMIN — SODIUM CHLORIDE 5 MG/HR: 900 INJECTION, SOLUTION INTRAVENOUS at 13:58

## 2022-05-31 ENCOUNTER — APPOINTMENT (OUTPATIENT)
Dept: CT IMAGING | Facility: HOSPITAL | Age: 77
End: 2022-05-31

## 2022-05-31 ENCOUNTER — APPOINTMENT (OUTPATIENT)
Dept: CARDIOLOGY | Facility: HOSPITAL | Age: 77
End: 2022-05-31

## 2022-05-31 LAB
ANION GAP SERPL CALCULATED.3IONS-SCNC: 13 MMOL/L (ref 5–15)
BASOPHILS # BLD AUTO: 0.1 10*3/MM3 (ref 0–0.2)
BASOPHILS NFR BLD AUTO: 0.9 % (ref 0–1.5)
BUN SERPL-MCNC: 18 MG/DL (ref 8–23)
BUN/CREAT SERPL: 18.9 (ref 7–25)
CALCIUM SPEC-SCNC: 9 MG/DL (ref 8.6–10.5)
CHLORIDE SERPL-SCNC: 105 MMOL/L (ref 98–107)
CO2 SERPL-SCNC: 17 MMOL/L (ref 22–29)
CREAT SERPL-MCNC: 0.95 MG/DL (ref 0.57–1)
DEPRECATED RDW RBC AUTO: 60.6 FL (ref 37–54)
EGFRCR SERPLBLD CKD-EPI 2021: 62.6 ML/MIN/1.73
EOSINOPHIL # BLD AUTO: 0.13 10*3/MM3 (ref 0–0.4)
EOSINOPHIL NFR BLD AUTO: 1.2 % (ref 0.3–6.2)
ERYTHROCYTE [DISTWIDTH] IN BLOOD BY AUTOMATED COUNT: 22.8 % (ref 12.3–15.4)
GLUCOSE BLDC GLUCOMTR-MCNC: 141 MG/DL (ref 70–130)
GLUCOSE BLDC GLUCOMTR-MCNC: 168 MG/DL (ref 70–130)
GLUCOSE BLDC GLUCOMTR-MCNC: 250 MG/DL (ref 70–130)
GLUCOSE SERPL-MCNC: 156 MG/DL (ref 65–99)
HCT VFR BLD AUTO: 36.9 % (ref 34–46.6)
HGB BLD-MCNC: 11.2 G/DL (ref 12–15.9)
IMM GRANULOCYTES # BLD AUTO: 0.04 10*3/MM3 (ref 0–0.05)
IMM GRANULOCYTES NFR BLD AUTO: 0.4 % (ref 0–0.5)
LYMPHOCYTES # BLD AUTO: 3.41 10*3/MM3 (ref 0.7–3.1)
LYMPHOCYTES NFR BLD AUTO: 30.5 % (ref 19.6–45.3)
MCH RBC QN AUTO: 23 PG (ref 26.6–33)
MCHC RBC AUTO-ENTMCNC: 30.4 G/DL (ref 31.5–35.7)
MCV RBC AUTO: 75.8 FL (ref 79–97)
MONOCYTES # BLD AUTO: 0.76 10*3/MM3 (ref 0.1–0.9)
MONOCYTES NFR BLD AUTO: 6.8 % (ref 5–12)
NEUTROPHILS NFR BLD AUTO: 6.74 10*3/MM3 (ref 1.7–7)
NEUTROPHILS NFR BLD AUTO: 60.2 % (ref 42.7–76)
NRBC BLD AUTO-RTO: 0 /100 WBC (ref 0–0.2)
PLATELET # BLD AUTO: 369 10*3/MM3 (ref 140–450)
PMV BLD AUTO: 8.9 FL (ref 6–12)
POTASSIUM SERPL-SCNC: 4.8 MMOL/L (ref 3.5–5.2)
RBC # BLD AUTO: 4.87 10*6/MM3 (ref 3.77–5.28)
SODIUM SERPL-SCNC: 135 MMOL/L (ref 136–145)
WBC NRBC COR # BLD: 11.18 10*3/MM3 (ref 3.4–10.8)

## 2022-05-31 PROCEDURE — G0378 HOSPITAL OBSERVATION PER HR: HCPCS

## 2022-05-31 PROCEDURE — 96376 TX/PRO/DX INJ SAME DRUG ADON: CPT

## 2022-05-31 PROCEDURE — 63710000001 INSULIN ASPART PER 5 UNITS: Performed by: INTERNAL MEDICINE

## 2022-05-31 PROCEDURE — 93306 TTE W/DOPPLER COMPLETE: CPT

## 2022-05-31 PROCEDURE — 85025 COMPLETE CBC W/AUTO DIFF WBC: CPT | Performed by: INTERNAL MEDICINE

## 2022-05-31 PROCEDURE — 25010000002 ENOXAPARIN PER 10 MG: Performed by: INTERNAL MEDICINE

## 2022-05-31 PROCEDURE — 82962 GLUCOSE BLOOD TEST: CPT

## 2022-05-31 PROCEDURE — 96361 HYDRATE IV INFUSION ADD-ON: CPT

## 2022-05-31 PROCEDURE — 99204 OFFICE O/P NEW MOD 45 MIN: CPT | Performed by: ORTHOPAEDIC SURGERY

## 2022-05-31 PROCEDURE — 72192 CT PELVIS W/O DYE: CPT

## 2022-05-31 PROCEDURE — 96372 THER/PROPH/DIAG INJ SC/IM: CPT

## 2022-05-31 PROCEDURE — 25010000002 MORPHINE PER 10 MG: Performed by: INTERNAL MEDICINE

## 2022-05-31 PROCEDURE — 80048 BASIC METABOLIC PNL TOTAL CA: CPT | Performed by: INTERNAL MEDICINE

## 2022-05-31 RX ADMIN — Medication 10 ML: at 20:40

## 2022-05-31 RX ADMIN — MORPHINE SULFATE 2 MG: 2 INJECTION, SOLUTION INTRAMUSCULAR; INTRAVENOUS at 15:50

## 2022-05-31 RX ADMIN — DILTIAZEM HYDROCHLORIDE 60 MG: 60 TABLET, FILM COATED ORAL at 00:51

## 2022-05-31 RX ADMIN — ATORVASTATIN CALCIUM 40 MG: 40 TABLET, FILM COATED ORAL at 08:42

## 2022-05-31 RX ADMIN — MORPHINE SULFATE 2 MG: 2 INJECTION, SOLUTION INTRAMUSCULAR; INTRAVENOUS at 08:47

## 2022-05-31 RX ADMIN — INSULIN ASPART 2 UNITS: 100 INJECTION, SOLUTION INTRAVENOUS; SUBCUTANEOUS at 06:49

## 2022-05-31 RX ADMIN — CITALOPRAM HYDROBROMIDE 20 MG: 20 TABLET ORAL at 08:42

## 2022-05-31 RX ADMIN — Medication 10 ML: at 08:42

## 2022-05-31 RX ADMIN — ENOXAPARIN SODIUM 40 MG: 40 INJECTION SUBCUTANEOUS at 18:11

## 2022-05-31 RX ADMIN — DILTIAZEM HYDROCHLORIDE 60 MG: 60 TABLET, FILM COATED ORAL at 12:19

## 2022-05-31 RX ADMIN — INSULIN ASPART 6 UNITS: 100 INJECTION, SOLUTION INTRAVENOUS; SUBCUTANEOUS at 12:19

## 2022-05-31 RX ADMIN — DILTIAZEM HYDROCHLORIDE 60 MG: 60 TABLET, FILM COATED ORAL at 05:55

## 2022-05-31 RX ADMIN — MORPHINE SULFATE 2 MG: 2 INJECTION, SOLUTION INTRAMUSCULAR; INTRAVENOUS at 20:39

## 2022-05-31 RX ADMIN — SODIUM CHLORIDE 125 ML/HR: 9 INJECTION, SOLUTION INTRAVENOUS at 05:55

## 2022-05-31 RX ADMIN — Medication 1 TABLET: at 08:42

## 2022-05-31 RX ADMIN — MORPHINE SULFATE 2 MG: 2 INJECTION, SOLUTION INTRAMUSCULAR; INTRAVENOUS at 01:57

## 2022-05-31 RX ADMIN — DILTIAZEM HYDROCHLORIDE 60 MG: 60 TABLET, FILM COATED ORAL at 18:11

## 2022-05-31 RX ADMIN — MORPHINE SULFATE 2 MG: 2 INJECTION, SOLUTION INTRAMUSCULAR; INTRAVENOUS at 05:55

## 2022-06-01 ENCOUNTER — TELEPHONE (OUTPATIENT)
Dept: CARDIOLOGY | Facility: CLINIC | Age: 77
End: 2022-06-01

## 2022-06-01 VITALS
WEIGHT: 156.8 LBS | HEIGHT: 66 IN | BODY MASS INDEX: 25.2 KG/M2 | OXYGEN SATURATION: 98 % | SYSTOLIC BLOOD PRESSURE: 122 MMHG | RESPIRATION RATE: 18 BRPM | TEMPERATURE: 97.6 F | HEART RATE: 92 BPM | DIASTOLIC BLOOD PRESSURE: 63 MMHG

## 2022-06-01 PROBLEM — S76.012A STRAIN OF LEFT HIP: Status: ACTIVE | Noted: 2022-06-01

## 2022-06-01 LAB
ANION GAP SERPL CALCULATED.3IONS-SCNC: 12 MMOL/L (ref 5–15)
BASOPHILS # BLD AUTO: 0.1 10*3/MM3 (ref 0–0.2)
BASOPHILS NFR BLD AUTO: 1 % (ref 0–1.5)
BH CV ECHO MEAS - ACS: 1.17 CM
BH CV ECHO MEAS - AO MAX PG: 23.7 MMHG
BH CV ECHO MEAS - AO MEAN PG: 12.4 MMHG
BH CV ECHO MEAS - AO ROOT DIAM: 3.6 CM
BH CV ECHO MEAS - AO V2 MAX: 243.4 CM/SEC
BH CV ECHO MEAS - AO V2 VTI: 34.6 CM
BH CV ECHO MEAS - AVA(I,D): 2.43 CM2
BH CV ECHO MEAS - EDV(CUBED): 51.4 ML
BH CV ECHO MEAS - EDV(MOD-SP2): 36.5 ML
BH CV ECHO MEAS - EDV(MOD-SP4): 37.9 ML
BH CV ECHO MEAS - EF(MOD-BP): 54.8 %
BH CV ECHO MEAS - EF(MOD-SP2): 52.6 %
BH CV ECHO MEAS - EF(MOD-SP4): 54.1 %
BH CV ECHO MEAS - ESV(CUBED): 18.2 ML
BH CV ECHO MEAS - ESV(MOD-SP2): 17.3 ML
BH CV ECHO MEAS - ESV(MOD-SP4): 17.4 ML
BH CV ECHO MEAS - FS: 29.2 %
BH CV ECHO MEAS - IVS/LVPW: 1.11 CM
BH CV ECHO MEAS - IVSD: 1.53 CM
BH CV ECHO MEAS - LA DIMENSION: 4.1 CM
BH CV ECHO MEAS - LV DIASTOLIC VOL/BSA (35-75): 21.1 CM2
BH CV ECHO MEAS - LV MASS(C)D: 201.4 GRAMS
BH CV ECHO MEAS - LV MAX PG: 15 MMHG
BH CV ECHO MEAS - LV MEAN PG: 9.8 MMHG
BH CV ECHO MEAS - LV SYSTOLIC VOL/BSA (12-30): 9.7 CM2
BH CV ECHO MEAS - LV V1 MAX: 193.9 CM/SEC
BH CV ECHO MEAS - LV V1 VTI: 28.3 CM
BH CV ECHO MEAS - LVIDD: 3.7 CM
BH CV ECHO MEAS - LVIDS: 2.6 CM
BH CV ECHO MEAS - LVOT AREA: 3 CM2
BH CV ECHO MEAS - LVOT DIAM: 1.94 CM
BH CV ECHO MEAS - LVPWD: 1.39 CM
BH CV ECHO MEAS - MV DEC SLOPE: 521 CM/SEC2
BH CV ECHO MEAS - MV MAX PG: 4.1 MMHG
BH CV ECHO MEAS - MV MEAN PG: 1 MMHG
BH CV ECHO MEAS - MV P1/2T: 55.3 MSEC
BH CV ECHO MEAS - MV V2 VTI: 21.4 CM
BH CV ECHO MEAS - MVA(P1/2T): 4 CM2
BH CV ECHO MEAS - MVA(VTI): 3.9 CM2
BH CV ECHO MEAS - PA V2 MAX: 105.7 CM/SEC
BH CV ECHO MEAS - PI END-D VEL: 122.2 CM/SEC
BH CV ECHO MEAS - RVDD: 2.3 CM
BH CV ECHO MEAS - SI(MOD-SP2): 10.7 ML/M2
BH CV ECHO MEAS - SI(MOD-SP4): 11.4 ML/M2
BH CV ECHO MEAS - SV(LVOT): 83.9 ML
BH CV ECHO MEAS - SV(MOD-SP2): 19.2 ML
BH CV ECHO MEAS - SV(MOD-SP4): 20.5 ML
BH CV ECHO MEAS - TAPSE (>1.6): 1.8 CM
BUN SERPL-MCNC: 17 MG/DL (ref 8–23)
BUN/CREAT SERPL: 16.2 (ref 7–25)
CALCIUM SPEC-SCNC: 9.8 MG/DL (ref 8.6–10.5)
CHLORIDE SERPL-SCNC: 104 MMOL/L (ref 98–107)
CO2 SERPL-SCNC: 20 MMOL/L (ref 22–29)
CREAT SERPL-MCNC: 1.05 MG/DL (ref 0.57–1)
DEPRECATED RDW RBC AUTO: 61.1 FL (ref 37–54)
EGFRCR SERPLBLD CKD-EPI 2021: 55.5 ML/MIN/1.73
EOSINOPHIL # BLD AUTO: 0.19 10*3/MM3 (ref 0–0.4)
EOSINOPHIL NFR BLD AUTO: 1.8 % (ref 0.3–6.2)
ERYTHROCYTE [DISTWIDTH] IN BLOOD BY AUTOMATED COUNT: 22.7 % (ref 12.3–15.4)
GLUCOSE BLDC GLUCOMTR-MCNC: 194 MG/DL (ref 70–130)
GLUCOSE BLDC GLUCOMTR-MCNC: 323 MG/DL (ref 70–130)
GLUCOSE BLDC GLUCOMTR-MCNC: 365 MG/DL (ref 70–130)
GLUCOSE SERPL-MCNC: 192 MG/DL (ref 65–99)
HCT VFR BLD AUTO: 35.7 % (ref 34–46.6)
HGB BLD-MCNC: 10.6 G/DL (ref 12–15.9)
IMM GRANULOCYTES # BLD AUTO: 0.03 10*3/MM3 (ref 0–0.05)
IMM GRANULOCYTES NFR BLD AUTO: 0.3 % (ref 0–0.5)
LYMPHOCYTES # BLD AUTO: 2.96 10*3/MM3 (ref 0.7–3.1)
LYMPHOCYTES NFR BLD AUTO: 28.2 % (ref 19.6–45.3)
MAXIMAL PREDICTED HEART RATE: 145 BPM
MCH RBC QN AUTO: 22.7 PG (ref 26.6–33)
MCHC RBC AUTO-ENTMCNC: 29.7 G/DL (ref 31.5–35.7)
MCV RBC AUTO: 76.6 FL (ref 79–97)
MONOCYTES # BLD AUTO: 0.69 10*3/MM3 (ref 0.1–0.9)
MONOCYTES NFR BLD AUTO: 6.6 % (ref 5–12)
NEUTROPHILS NFR BLD AUTO: 6.52 10*3/MM3 (ref 1.7–7)
NEUTROPHILS NFR BLD AUTO: 62.1 % (ref 42.7–76)
NRBC BLD AUTO-RTO: 0 /100 WBC (ref 0–0.2)
PLATELET # BLD AUTO: 373 10*3/MM3 (ref 140–450)
PMV BLD AUTO: 9.1 FL (ref 6–12)
POTASSIUM SERPL-SCNC: 4.6 MMOL/L (ref 3.5–5.2)
RBC # BLD AUTO: 4.66 10*6/MM3 (ref 3.77–5.28)
SODIUM SERPL-SCNC: 136 MMOL/L (ref 136–145)
STRESS TARGET HR: 123 BPM
WBC NRBC COR # BLD: 10.49 10*3/MM3 (ref 3.4–10.8)

## 2022-06-01 PROCEDURE — 82962 GLUCOSE BLOOD TEST: CPT

## 2022-06-01 PROCEDURE — 80048 BASIC METABOLIC PNL TOTAL CA: CPT | Performed by: INTERNAL MEDICINE

## 2022-06-01 PROCEDURE — 25010000002 MORPHINE PER 10 MG: Performed by: INTERNAL MEDICINE

## 2022-06-01 PROCEDURE — 96376 TX/PRO/DX INJ SAME DRUG ADON: CPT

## 2022-06-01 PROCEDURE — G0378 HOSPITAL OBSERVATION PER HR: HCPCS

## 2022-06-01 PROCEDURE — 99213 OFFICE O/P EST LOW 20 MIN: CPT | Performed by: ORTHOPAEDIC SURGERY

## 2022-06-01 PROCEDURE — 85025 COMPLETE CBC W/AUTO DIFF WBC: CPT | Performed by: INTERNAL MEDICINE

## 2022-06-01 PROCEDURE — 63710000001 INSULIN ASPART PER 5 UNITS: Performed by: INTERNAL MEDICINE

## 2022-06-01 PROCEDURE — 97165 OT EVAL LOW COMPLEX 30 MIN: CPT

## 2022-06-01 RX ORDER — HYDROCODONE BITARTRATE AND ACETAMINOPHEN 7.5; 325 MG/1; MG/1
1 TABLET ORAL EVERY 6 HOURS PRN
Qty: 18 TABLET | Refills: 0 | Status: SHIPPED | OUTPATIENT
Start: 2022-06-01 | End: 2022-06-21

## 2022-06-01 RX ORDER — DILTIAZEM HYDROCHLORIDE 180 MG/1
180 CAPSULE, COATED, EXTENDED RELEASE ORAL
Qty: 90 CAPSULE | Refills: 2 | Status: SHIPPED | OUTPATIENT
Start: 2022-06-02 | End: 2022-11-22

## 2022-06-01 RX ORDER — ATORVASTATIN CALCIUM 40 MG/1
40 TABLET, FILM COATED ORAL DAILY
Qty: 90 TABLET | Refills: 0 | Status: SHIPPED | OUTPATIENT
Start: 2022-06-01 | End: 2022-08-31 | Stop reason: SDUPTHER

## 2022-06-01 RX ORDER — HYDROCODONE BITARTRATE AND ACETAMINOPHEN 5; 325 MG/1; MG/1
1 TABLET ORAL EVERY 6 HOURS PRN
Status: DISCONTINUED | OUTPATIENT
Start: 2022-06-01 | End: 2022-06-01 | Stop reason: HOSPADM

## 2022-06-01 RX ORDER — DILTIAZEM HYDROCHLORIDE 180 MG/1
180 CAPSULE, COATED, EXTENDED RELEASE ORAL
Status: DISCONTINUED | OUTPATIENT
Start: 2022-06-01 | End: 2022-06-01 | Stop reason: HOSPADM

## 2022-06-01 RX ADMIN — INSULIN ASPART 2 UNITS: 100 INJECTION, SOLUTION INTRAVENOUS; SUBCUTANEOUS at 08:51

## 2022-06-01 RX ADMIN — DILTIAZEM HYDROCHLORIDE 180 MG: 180 CAPSULE, COATED, EXTENDED RELEASE ORAL at 11:20

## 2022-06-01 RX ADMIN — HYDROCODONE BITARTRATE AND ACETAMINOPHEN 1 TABLET: 5; 325 TABLET ORAL at 11:27

## 2022-06-01 RX ADMIN — DILTIAZEM HYDROCHLORIDE 60 MG: 60 TABLET, FILM COATED ORAL at 05:28

## 2022-06-01 RX ADMIN — Medication 1 TABLET: at 08:50

## 2022-06-01 RX ADMIN — DILTIAZEM HYDROCHLORIDE 60 MG: 60 TABLET, FILM COATED ORAL at 00:35

## 2022-06-01 RX ADMIN — MORPHINE SULFATE 2 MG: 2 INJECTION, SOLUTION INTRAMUSCULAR; INTRAVENOUS at 01:14

## 2022-06-01 RX ADMIN — ATORVASTATIN CALCIUM 40 MG: 40 TABLET, FILM COATED ORAL at 08:50

## 2022-06-01 RX ADMIN — APIXABAN 5 MG: 5 TABLET, FILM COATED ORAL at 11:21

## 2022-06-01 RX ADMIN — INSULIN ASPART 8 UNITS: 100 INJECTION, SOLUTION INTRAVENOUS; SUBCUTANEOUS at 11:18

## 2022-06-01 RX ADMIN — MORPHINE SULFATE 2 MG: 2 INJECTION, SOLUTION INTRAMUSCULAR; INTRAVENOUS at 05:28

## 2022-06-01 RX ADMIN — CITALOPRAM HYDROBROMIDE 20 MG: 20 TABLET ORAL at 08:50

## 2022-06-01 RX ADMIN — Medication 10 ML: at 08:50

## 2022-06-01 NOTE — TELEPHONE ENCOUNTER
Patient discharged from the hospital with instructions to f/u with a NEW PT appointment with Dr Barnett in 1 week for new onset AFIB- No appointments available and a message was sent to Colleen and she instructed me to put as first available. Attempted to reach the patient and left a message left to return our call

## 2022-06-01 NOTE — THERAPY EVALUATION
Acute Care - Occupational Therapy Initial Evaluation  [facility]     Patient Name: Shivani Galvez  : 1945  MRN: [id number]  Today's Date: 2022  Onset of Illness/Injury or Date of Surgery: 22  Date of Referral to OT: 22  Referring Physician: HARISH Garcia MD    Admit Date: 2022       ICD-10-CM ICD-9-CM   1. Atrial fibrillation with RVR (HCC)  I48.91 427.31   2. Strain of left hip, initial encounter  S76.012A 843.9   3. Atrial fibrillation, unspecified type (HCC)  I48.91 427.31   4. Benign hypertension  I10 401.1   5. Benign hypertensive heart disease without heart failure  I11.9 402.10   6. Impaired mobility and activities of daily living  Z74.09 V49.89    Z78.9      Patient Active Problem List   Diagnosis   • Anemia due to gastrointestinal blood loss   • Atrial fibrillation with RVR (HCC)   • Left hip pain     Past Medical History:   Diagnosis Date   • Anemia    • Diabetes mellitus (HCC)    • History of transfusion    • Hyperlipidemia    • Hypertension    • TIA (transient ischemic attack)      Past Surgical History:   Procedure Laterality Date   • COLONOSCOPY N/A 2019    Procedure: COLONOSCOPY;  Surgeon: Lucio Ni DO;  Location: [facility] ENDOSCOPY;  Service: Gastroenterology   • TUBAL ABDOMINAL LIGATION           OT ASSESSMENT FLOWSHEET (last 12 hours)     OT Evaluation and Treatment     Row Name 22 1019                   OT Time and Intention    Subjective Information complains of;pain  -RB        Document Type evaluation  -RB        Mode of Treatment individual therapy;occupational therapy  -RB        Patient Effort good  -RB                  General Information    Patient Profile Reviewed yes  -RB        Onset of Illness/Injury or Date of Surgery 22  -RB        Referring Physician HARISH Garcia MD  -RB        Prior Level of Function independent:;gait;transfer;ADL's;home management;driving  -RB        Equipment Currently Used at Home rollator;shower chair   -RB        Existing Precautions/Restrictions fall  -RB        Equipment Issued to Patient gait belt  -RB        Risks Reviewed patient:;LOB  -RB                  Previous Level of Function/Home Environm    Bathing/Grooming, Premorbid Functional Level independent  -RB        Dressing, Premorbid Functional Level independent  -RB        Eating/Feeding, Premorbid Functional Level independent  -RB        Toileting, Premorbid Functional Level independent  -RB                  Living Environment    Current Living Arrangements home  -RB        Home Accessibility stairs to enter home  -RB        People in Home spouse  -RB                  Home Main Entrance    Number of Stairs, Main Entrance one  -RB        Stair Railings, Main Entrance none  -RB                  Home Use of Assistive/Adaptive Equipment    Equipment Currently Used at Home rollator;shower chair  -RB                  Pain Assessment    Pretreatment Pain Rating 5/10  -RB        Posttreatment Pain Rating 2/10  -RB        Pain Location - Side/Orientation Left  -RB        Pain Location - hip  -RB        Pain Intervention(s) Medication (See MAR);Ambulation/increased activity  -RB                  Cognition    Affect/Mental Status (Cognition) WFL  -RB        Orientation Status (Cognition) oriented x 4  -RB                  Range of Motion Comprehensive    General Range of Motion no range of motion deficits identified;bilateral upper extremity ROM WFL  -RB                  Strength Comprehensive (MMT)    General Manual Muscle Testing (MMT) Assessment other (see comments)  -RB        Comment, General Manual Muscle Testing (MMT) Assessment B  UE strength was 4+/5 grossly.  -RB                  Activities of Daily Living    BADL Assessment/Intervention lower body dressing;toileting  -RB                  Lower Body Dressing Assessment/Training    [place] Level (Lower Body Dressing) lower body dressing skills;don;doff;socks;independent  -RB        Position (Lower  Body Dressing) edge of bed sitting  -RB                  Toileting Assessment/Training    [place] Level (Toileting) toileting skills;adjust/manage clothing;perform perineal hygiene;modified independence  -RB        Assistive Devices (Toileting) commode, bedside with drop arms  over regular toilet.  -RB        Position (Toileting) unsupported standing;unsupported sitting  -RB                  Bed Mobility    Bed Mobility bed mobility (all) activities  -RB        All Activities, [place] (Bed Mobility) independent  -RB                  Functional Mobility    Functional Mobility- Ind. Level conditional independence  -RB        Functional Mobility- Device walker, front-wheeled  -RB        Functional Mobility-Distance (Feet) 160  -RB                  Transfer Assessment/Treatment    Transfers sit-stand transfer;toilet transfer  -RB                  Transfers    Sit-Stand [place] (Transfers) modified independence  -RB        [place] Level (Toilet Transfer) modified independence  -RB        Assistive Device (Toilet Transfer) commode, bedside with drop arms  -RB                  Sit-Stand Transfer    Assistive Device (Sit-Stand Transfers) walker, front-wheeled  -RB                  Toilet Transfer    Type (Toilet Transfer) sit-stand;stand-sit  -RB                  Vital Signs    Pre Systolic BP Rehab 140  -RB        Pre Treatment Diastolic BP 70  -RB        Post Systolic BP Rehab 134  -RB        Post Treatment Diastolic BP 65  -RB        Pretreatment Heart Rate (beats/min) 106  -RB        Posttreatment Heart Rate (beats/min) 100  -RB        Pre SpO2 (%) 98  -RB        O2 Delivery Pre Treatment room air  -RB        Post SpO2 (%) 98  -RB        O2 Delivery Post Treatment room air  -RB        Pre Patient Position Supine  -RB        Intra Patient Position Standing  -RB        Post Patient Position Supine  -RB                  Positioning and Restraints    Pre-Treatment Position in bed  -RB                   Therapy Assessment/Plan (OT)    Date of Referral to OT 06/01/22  -RB        Functional Level at Time of Evaluation (OT) Pt demo independence with LB dressing, toileting and bed mobility.  Pt also modified independent with 160' ambulation in schuler with R/W.  -RB        Criteria for Skilled Therapeutic Interventions Met (OT) no problems identified which require skilled intervention  -RB                  Therapy Plan Review/Discharge Plan (OT)    Anticipated Discharge Disposition (OT) home  -RB              User Key  (r) = Recorded By, (t) = Taken By, (c) = Cosigned By    Initials Name Effective Dates    RB Nayan Bull, FLORIN 06/16/21 -                  Occupational Therapy Education                 Title: PT OT SLP Therapies (In Progress)     Topic: Occupational Therapy (In Progress)     Point: ADL training (Not Started)     Description:   Instruct learner(s) on proper safety adaptation and remediation techniques during self care or transfers.   Instruct in proper use of assistive devices.              Learner Progress:  Not documented in this visit.          Point: Home exercise program (Not Started)     Description:   Instruct learner(s) on appropriate technique for monitoring, assisting and/or progressing therapeutic exercises/activities.              Learner Progress:  Not documented in this visit.          Point: Precautions (Done)     Description:   Instruct learner(s) on prescribed precautions during self-care and functional transfers.              Learning Progress Summary           Patient Acceptance, E, VU by RB at 6/1/2022 1124    Comment: Edu pt on use of non skid socks when OOB.  Edu pt to use R/W while L hip pain in present.                   Point: Body mechanics (Not Started)     Description:   Instruct learner(s) on proper positioning and spine alignment during self-care, functional mobility activities and/or exercises.              Learner Progress:  Not documented in this visit.                       User Key     Initials Effective Dates Name Provider Type Discipline    RB 06/16/21 -  Nayan Bull OT Occupational Therapist OT                  OT Recommendation and Plan     Plan of Care Review  Plan of Care Reviewed With: patient  Outcome Evaluation: OT martinez on this date.  Pt demonstrates independence with bed mobility.  She was also independent with LB dressing and toilet transfer.  She was modified independent with 160' ambulation with R/W.  Pt has been using R/W for past wk due to L hip pain.  Rec continued use of R/W when D/C home.  No problems identified which require skilled OT intervention.  Rec safety bar for shower when D/C home.  Plan of Care Reviewed With: patient  Outcome Evaluation: OT martinez on this date.  Pt demonstrates independence with bed mobility.  She was also independent with LB dressing and toilet transfer.  She was modified independent with 160' ambulation with R/W.  Pt has been using R/W for past wk due to L hip pain.  Rec continued use of R/W when D/C home.  No problems identified which require skilled OT intervention.  Rec safety bar for shower when D/C home.     Outcome Measures     Row Name 06/01/22 1019             How much help from another is currently needed...    Putting on and taking off regular lower body clothing? 4  -RB      Bathing (including washing, rinsing, and drying) 4  -RB      Toileting (which includes using toilet bed pan or urinal) 4  -RB      Putting on and taking off regular upper body clothing 4  -RB      Taking care of personal grooming (such as brushing teeth) 4  -RB      Eating meals 4  -RB      AM-PAC 6 Clicks Score (OT) 24  -RB              Functional Assessment    Outcome Measure Options AM-PAC 6 Clicks Daily Activity (OT)  -RB            User Key  (r) = Recorded By, (t) = Taken By, (c) = Cosigned By    Initials Name Provider Type    RB Nayan Bull OT Occupational Therapist                Time Calculation:    Time Calculation- OT     Row Name 06/01/22  1134             Time Calculation- OT    OT Start Time 1019  -RB      OT Stop Time 1106  -RB      OT Time Calculation (min) 47 min  -RB      OT Received On 06/01/22  -            User Key  (r) = Recorded By, (t) = Taken By, (c) = Cosigned By    Initials Name Provider Type    RB Nayan Bull OT Occupational Therapist              Therapy Charges for Today     Code Description Service Date Service Provider Modifiers Qty    16661403997 HC OT EVAL LOW COMPLEXITY 3 6/1/2022 Nayan Bull OT GO 1               Nayan Bull OT  6/1/2022

## 2022-06-01 NOTE — PLAN OF CARE
Goal Outcome Evaluation:  Plan of Care Reviewed With: patient           Outcome Evaluation: OT eval on this date.  Pt demonstrates independence with bed mobility.  She was also independent with LB dressing and toilet transfer.  She was modified independent with 160' ambulation with R/W.  Pt has been using R/W for past wk due to L hip pain.  Rec continued use of R/W when D/C home.  No problems identified which require skilled OT intervention.  Rec safety bar for shower when D/C home.

## 2022-06-01 NOTE — DISCHARGE SUMMARY
[facility] Medicine Services  DISCHARGE SUMMARY       Date of Admission: 5/30/2022  Date of Discharge:  6/1/2022  Primary Care Physician: Rosemary Mosley MD    Presenting Problem/History of Present Illness:  Atrial fibrillation with RVR (HCC) [I48.91]  Strain of left hip, initial encounter [S76.012A]     Final Discharge Diagnoses:  Active Hospital Problems    Diagnosis    • **Atrial fibrillation with RVR (HCC)    • Strain of left hip    • Left hip pain      Consults:   Consults     Date and Time Order Name Status Description    5/30/2022  4:30 PM Inpatient Orthopedic Surgery Consult Completed     5/30/2022 12:08 PM Hospitalist (on-call MD unless specified)            Procedures Performed: None                Pertinent Test Results:   Procedure Component Value Units Date/Time   CT Pelvis Without Contrast [[id number]] [place] as Reviewed   Order Status: Completed Collected: 05/31/22 0802    Updated: 05/31/22 1305   Narrative:     Procedure: CT pelvis without contrast     Reason for exam: Pelvic pain. Stress fracture suspected. Negative   x-ray.     FINDINGS: Axial computed tomography sequential imaging was   performed from the iliac crests through the symphysis pubis   without IV contrast administration. Sagittal and coronal   reformation was performed. This exam was performed according to   our departmental dose optimization program, which includes   automated exposure control, adjustment of the mA and/or KV   according to patient size and/or use of iterative reconstruction   technique.     Bony structures of the pelvis are normal. There is no evidence of   fracture or dislocation identified. Soft tissue structures of the   pelvis are unremarkable.    Impression:     Negative CT pelvis with no acute abnormality   identified.      Chief Complaint on Day of Discharge: None    Hospital Course:  The patient is a 77 y.o. female with a past medical history of type 2 diabetes  "mellitus, essential hypertension, psoriatic arthritis, and osteoporosis.  She presented with complaints of worsening left hip and knee pain of 1 month duration and was found to have fast heart rate in the emergency room on presentation consistent with atrial fibrillation.     Patient noticed that her left knee buckled about 1 month prior to presentation and she fell forward striking her knee and hip on the patio concrete in her house.  She subsequently had left hip and knee pain which has worsened despite seeing outpatient orthopedic surgery INDERJIT Frye with intra-articular steroid injection.  Patient's left hip pain worsened the night prior to presentation so she decided to come to the emergency room for evaluation.  She was found to be in atrial fibrillation with rapid ventricular rate and was started on Cardizem drip.  Left hip x-ray showed an 8 mm old avulsion and left inferior pubic ramus and a smooth marginated 2.5 cm overall bony density medial to the right femoral neck, possible old fracture fragment or possible synovial osteochondroma.  Patient was recommended for admission.  Her heart rate was better controlled and she was transitioned to oral Cardizem.  Anticoagulation was not started as patient had GI bleed in the past and the risk was deemed unacceptable.  She was reviewed by orthopedic surgeon on account of her left hip pain and a CT scan of the pelvis did not show any occult fracture.  Her left knee pain was thought to be due to a strain and she was prescribed opiate pain medications.  She was discharged in stable condition.  She was given a referral to see a cardiologist after discharge.    Condition on Discharge: Stable    Physical Exam on Discharge:  /63 (BP Location: Left arm, Patient Position: Lying)   Pulse 92   Temp 97.6 °F (36.4 °C) (Temporal)   Resp 18   Ht 167.6 cm (66\")   Wt 71.1 kg (156 lb 12.8 oz)   LMP  (LMP Unknown)   SpO2 98%   BMI 25.31 kg/m²   Physical " Exam  Constitutional:       General: She is not in acute distress.     Appearance: She is not ill-appearing or diaphoretic.   HENT:      Head: Normocephalic and atraumatic.      Right Ear: External ear normal.      Left Ear: External ear normal.      Nose: No congestion or rhinorrhea.      Mouth/Throat:      Mouth: Mucous membranes are moist.      Pharynx: No oropharyngeal exudate or posterior oropharyngeal erythema.   Eyes:      General: No scleral icterus.     Extraocular Movements: Extraocular movements intact.      Conjunctiva/sclera: Conjunctivae normal.   Cardiovascular:      Rate and Rhythm: Normal rate. Rhythm irregular.      Heart sounds: Normal heart sounds. No murmur heard.  Pulmonary:      Effort: Pulmonary effort is normal. No respiratory distress.      Breath sounds: Normal breath sounds. No wheezing, rhonchi or rales.   Abdominal:      General: Abdomen is flat. There is no distension.      Palpations: Abdomen is soft.      Tenderness: There is no abdominal tenderness. There is no guarding.   Musculoskeletal:         General: Tenderness present. No swelling or deformity.      Cervical back: Neck supple. No rigidity. No muscular tenderness.      Right lower leg: No edema.      Left lower leg: No edema.      Comments: Left hip tenderness  Lymphadenopathy:      Cervical: No cervical adenopathy.   Skin:     General: Skin is warm and dry.   Neurological:      General: No focal deficit present.      Mental Status: She is alert and oriented to person, place, and time.      Cranial Nerves: No cranial nerve deficit.      Motor: No weakness.   Psychiatric:         Mood and Affect: Mood normal.         Behavior: Behavior normal.         Thought Content: Thought content normal.     Discharge Disposition:  Home or Self Care    Discharge Medications:     Discharge Medications      New Medications      Instructions Start Date   atorvastatin 40 MG tablet  Commonly known as: LIPITOR   40 mg, Oral, Daily      dilTIAZem   MG 24 hr capsule  Commonly known as: CARDIZEM CD   180 mg, Oral, Every 24 Hours Scheduled   Start Date: June 2, 2022     HYDROcodone-acetaminophen 7.5-325 MG per tablet  Commonly known as: Norco   1 tablet, Oral, Every 6 Hours PRN         Continue These Medications      Instructions Start Date   accu-chek softclix lancets   1 Units      acetaminophen 650 MG 8 hr tablet  Commonly known as: TYLENOL   650 mg, Oral, 3 Times Daily PRN      alendronate 70 MG tablet  Commonly known as: FOSAMAX   70 mg, Oral, Weekly      calcium citrate-vitamin D 315-200 MG-UNIT per tablet  Commonly known as: CITRACAL+D   1 tablet, Oral, 2 Times Daily      citalopram 40 MG tablet  Commonly known as: CeleXA   1 tablet, Oral, Daily      metFORMIN 500 MG tablet  Commonly known as: GLUCOPHAGE   1,000 mg, Oral, 2 Times Daily         Stop These Medications    benazepril 40 MG tablet  Commonly known as: LOTENSIN     simvastatin 80 MG tablet  Commonly known as: ZOCOR            Discharge Diet:   Diet Instructions     Diet: Consistent Carbohydrate      Discharge Diet: Consistent Carbohydrate          Activity at Discharge:   Activity Instructions     Activity as Tolerated            Discharge Care Plan/Instructions:   1.  Follow-up with your primary care provider within 1 week of discharge.   2.  Follow-up with cardiologist Dr. Barnett within 1 week of discharge.   3.  You have a history of bleeding from the stomach at in the past so anticoagulation was not started due to risk of stomach bleeding after discussing with you.    Follow-up Appointments:   Future Appointments   Date Time Provider Department Center   6/6/2022  1:00 PM Jazlyn Frye APRN [facility]       Test Results Pending at Discharge:     Riky Murillo MD  06/01/22  12:15 CDT    Time: 38 minutes of time was spent evaluating patient and planning discharge.      Part of this note may be an electronic transcription/translation of spoken language to printed text using the  Dragon Dictation system.

## 2022-06-01 NOTE — PROGRESS NOTES
ORTHOPEDIC PROGRESS NOTE:    Name:  Shivani Galvez  Date:    2022  Date of admission:  2022    Chief Complaint:  Left hip pain    Subjective:  She states that pain has been better since she is gotten more medicine.  No new complaints.  No fevers or chills.  No numbness or tingling going down her leg.  No pain that shoots down her leg.    Vitals:     Vitals:    22 0305   BP: 117/61   Pulse: 79   Resp: 16   Temp: 97 °F (36.1 °C)   SpO2: 99%      Temp (24hrs), Av.9 °F (36.1 °C), Min:96.8 °F (36 °C), Max:97 °F (36.1 °C)      Exam:  Awake and alert.  Answers questions appropriately.  She does lift her left leg up from a lying position and has free range of motion of her left hip.  She is able to flex her hip and then fully extend her knee while in bed.  She tolerates full internal and external rotation of her hip without significant increase in pain.  She has mild tenderness on the lateral aspect of her hip.  Good distal pulses and sensation.  Calves are soft and nontender.    CT Pelvis Without Contrast    Result Date: 2022  Narrative: Procedure: CT pelvis without contrast Reason for exam: Pelvic pain. Stress fracture suspected. Negative x-ray. FINDINGS: Axial computed tomography sequential imaging was performed from the iliac crests through the symphysis pubis without IV contrast administration. Sagittal and coronal reformation was performed. This exam was performed according to our departmental dose optimization program, which includes automated exposure control, adjustment of the mA and/or KV according to patient size and/or use of iterative reconstruction technique. Bony structures of the pelvis are normal. There is no evidence of fracture or dislocation identified. Soft tissue structures of the pelvis are unremarkable.     Impression: Negative CT pelvis with no acute abnormality identified. Electronically signed by:  Nicanor De Dios MD  2022 1:03 PM CDT Workstation: [id number]    XR  Chest 1 View    Result Date: 5/30/2022  Narrative: PORTABLE CHEST HISTORY: Atrial fibrillation Portable AP supine film of the chest was obtained at 11:51 AM. COMPARISON: None FINDINGS: EKG leads. The lungs are clear of an acute process. The heart is not enlarged. The pulmonary vasculature is not increased. No pleural effusion. No pneumothorax. No acute osseous abnormality. Degenerative changes are present in the thoracic spine.     Impression: CONCLUSION: No Acute Disease 33139 Electronically signed by:  Sami Osuna MD  5/30/2022 11:49 AM [employer]uj[facility]T Workstation: [id number]    XR Hip With or Without Pelvis 2 - 3 View Left    Result Date: 5/30/2022  Narrative: Two view left hip with single view pelvis HISTORY: Chronic left hip pain. Left hip pain x1 month. AP and lateral views obtained. COMPARISON: None FINDINGS: No acute fracture or dislocation. Old 8 mm avulsion left inferior pubic ramus. Smoothly marginated 2.5 cm oval bony density medial to the right femoral neck, possible old fracture fragment or possible synovial osteochondroma. No joint space narrowing. Minimal degenerative change each iliac crest. Degenerative disc disease L4-5 and L5-S1. No other osseous or articular abnormality. Pelvic phleboliths. Atherosclerotic calcification iliac and femoral arteries. Gluteal calcifications.     Impression: CONCLUSION: Old 8 mm avulsion left inferior pubic ramus. Smoothly marginated 2.5 cm oval bony density medial to the right femoral neck, possible old fracture fragment or possible synovial osteochondroma. Minimal degenerative change each iliac crest. Degenerative disc disease L4-5 and L5-S1. [id number] Electronically signed by:  Sami Osuna MD  5/30/2022 11:55 AM [employer]T Workstation: [id number]        ASSESSMENT:  Active Hospital Problems    Diagnosis  POA   • **Atrial fibrillation with RVR ([facility]) [I48.91]  Yes   • Left hip pain [M25.552]  Yes         PLAN:    Patient appears to have full range of motion.  She has no discernible  abnormality on x-ray or CT scan of the pelvis and left hip.  Continue with mobility as tolerated.  If she continues having pain with weightbearing then MRI of the left hip would be warranted to ensure no underlying etiology.  However, if she is progressing and if pain is managed then that could also be done as an outpatient if she is not improving.  Certainly with her range of motion and active motion of her left hip there is a very low clinical suspicion that there is an underlying pathologic process in her left hip.  She may have a muscle strain or trochanteric bursitis as the source of pain.  Mobilize as tolerated.  Weightbearing as tolerated.  No orthopedic intervention warranted at this time.  Continue to see how she changes with mobility and pain medication.    06/01/22 at 07:05 CDT by Real Hurtado MD

## 2022-06-01 NOTE — PLAN OF CARE
Problem: Adult Inpatient Plan of Care  Goal: Plan of Care Review  6/1/2022 0022 by Daisy Lopez RN  Outcome: Ongoing, Progressing  6/1/2022 0021 by Daisy Lopez RN  Outcome: Ongoing, Progressing  Goal: Patient-Specific Goal (Individualized)  6/1/2022 0022 by Daisy Lopez RN  Outcome: Ongoing, Progressing  6/1/2022 0021 by Daisy Lopez RN  Outcome: Ongoing, Progressing  Goal: Absence of Hospital-Acquired Illness or Injury  6/1/2022 0022 by Daisy Lopez RN  Outcome: Ongoing, Progressing  6/1/2022 0021 by Daisy Lopez RN  Outcome: Ongoing, Progressing  Intervention: Identify and Manage Fall Risk  Recent Flowsheet Documentation  Taken 5/31/2022 2000 by Daisy Lopez RN  Safety Promotion/Fall Prevention:   safety round/check completed   assistive device/personal items within reach   nonskid shoes/slippers when out of bed  Intervention: Prevent Skin Injury  Recent Flowsheet Documentation  Taken 5/31/2022 2000 by Daisy Lopez RN  Body Position: sitting up in bed  Intervention: Prevent and Manage VTE (Venous Thromboembolism) Risk  Recent Flowsheet Documentation  Taken 5/31/2022 2000 by Daisy Lopez RN  Activity Management: activity adjusted per tolerance  Intervention: Prevent Infection  Recent Flowsheet Documentation  Taken 5/31/2022 2000 by Daisy Lopez RN  Infection Prevention:   single patient room provided   hand hygiene promoted  Goal: Optimal Comfort and Wellbeing  6/1/2022 0022 by Daisy Lopez RN  Outcome: Ongoing, Progressing  6/1/2022 0021 by Daisy Lopez RN  Outcome: Ongoing, Progressing  Intervention: Provide Person-Centered Care  Recent Flowsheet Documentation  Taken 5/31/2022 2000 by Daisy Lopez RN  Trust Relationship/Rapport:   care explained   questions answered   questions encouraged  Goal: Readiness for Transition of Care  6/1/2022 0022 by Daisy Lopez RN  Outcome: Ongoing, Progressing  6/1/2022 0021 by Daisy Lopez RN  Outcome: Ongoing, Progressing   Goal  Outcome Evaluation:

## 2022-06-01 NOTE — PLAN OF CARE
Problem: Adult Inpatient Plan of Care  Goal: Plan of Care Review  Outcome: Ongoing, Progressing  Goal: Patient-Specific Goal (Individualized)  Outcome: Ongoing, Progressing  Goal: Absence of Hospital-Acquired Illness or Injury  Outcome: Ongoing, Progressing  Intervention: Identify and Manage Fall Risk  Recent Flowsheet Documentation  Taken 5/31/2022 2000 by Daisy Lopez RN  Safety Promotion/Fall Prevention:   safety round/check completed   assistive device/personal items within reach   nonskid shoes/slippers when out of bed  Intervention: Prevent Skin Injury  Recent Flowsheet Documentation  Taken 5/31/2022 2000 by Daisy Lopez RN  Body Position: sitting up in bed  Intervention: Prevent and Manage VTE (Venous Thromboembolism) Risk  Recent Flowsheet Documentation  Taken 5/31/2022 2000 by Daisy Lopez RN  Activity Management: activity adjusted per tolerance  Intervention: Prevent Infection  Recent Flowsheet Documentation  Taken 5/31/2022 2000 by Daisy Lopez RN  Infection Prevention:   single patient room provided   hand hygiene promoted  Goal: Optimal Comfort and Wellbeing  Outcome: Ongoing, Progressing  Intervention: Provide Person-Centered Care  Recent Flowsheet Documentation  Taken 5/31/2022 2000 by Daisy Lopez RN  Trust Relationship/Rapport:   care explained   questions answered   questions encouraged  Goal: Readiness for Transition of Care  Outcome: Ongoing, Progressing   Goal Outcome Evaluation:

## 2022-06-02 LAB — GLUCOSE BLDC GLUCOMTR-MCNC: 211 MG/DL (ref 70–130)

## 2022-06-06 ENCOUNTER — OFFICE VISIT (OUTPATIENT)
Dept: ORTHOPEDIC SURGERY | Facility: CLINIC | Age: 77
End: 2022-06-06

## 2022-06-06 VITALS — BODY MASS INDEX: 25.41 KG/M2 | WEIGHT: 158.1 LBS | HEIGHT: 66 IN

## 2022-06-06 DIAGNOSIS — M25.562 LEFT KNEE PAIN, UNSPECIFIED CHRONICITY: Primary | ICD-10-CM
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
## 2022-06-06 DIAGNOSIS — M17.12 PRIMARY OSTEOARTHRITIS OF LEFT KNEE: ICD-10-CM

## 2022-06-06 DIAGNOSIS — G89.29 CHRONIC LEFT-SIDED LOW BACK PAIN WITH LEFT-SIDED SCIATICA: ICD-10-CM

## 2022-06-06 DIAGNOSIS — R20.0 NUMBNESS AND TINGLING OF LEFT LEG: ICD-10-CM

## 2022-06-06 DIAGNOSIS — M11.262 CHONDROCALCINOSIS OF LEFT KNEE: ICD-10-CM

## 2022-06-06 DIAGNOSIS — M25.562 ACUTE PAIN OF LEFT KNEE: ICD-10-CM

## 2022-06-06 DIAGNOSIS — M25.552 LEFT HIP PAIN: Primary | ICD-10-CM

## 2022-06-06 DIAGNOSIS — M54.42 CHRONIC LEFT-SIDED LOW BACK PAIN WITH LEFT-SIDED SCIATICA: ICD-10-CM

## 2022-06-06 DIAGNOSIS — M51.36 DDD (DEGENERATIVE DISC DISEASE), LUMBAR: ICD-10-CM

## 2022-06-06 DIAGNOSIS — R20.2 NUMBNESS AND TINGLING OF LEFT LEG: ICD-10-CM

## 2022-06-06 DIAGNOSIS — M54.10 RADICULAR PAIN OF LEFT LOWER EXTREMITY: ICD-10-CM

## 2022-06-06 PROCEDURE — 99214 OFFICE O/P EST MOD 30 MIN: CPT | Performed by: NURSE PRACTITIONER

## 2022-06-06 RX ORDER — GABAPENTIN 300 MG/1
300 CAPSULE ORAL NIGHTLY PRN
Qty: 30 CAPSULE | Refills: 1 | Status: SHIPPED | OUTPATIENT
Start: 2022-06-06 | End: 2022-08-31 | Stop reason: ALTCHOICE

## 2022-06-06 NOTE — PROGRESS NOTES
"  Shivani Galvez is a 77 y.o. female   Primary provider:  Rosemary Mosley MD       Chief Complaint   Patient presents with   • Left Knee - Pain, Initial Evaluation   • Left Hip - Pain, Initial Evaluation   • Lumbar Spine - Pain, Initial Evaluation     HISTORY OF PRESENT ILLNESS:    77-year-old female patient presents to office for evaluation of acute left hip pain and left knee pain.  Onset of pain occurred approximately 1-1/2 months ago.  Patient was initially evaluated at [facility] urgent care on 4/30/2022 as she had sustained a fall on that date when her left knee buckled.  She had x-rays performed on that date of the left knee and left hip, which were negative for bony injuries.  Patient was given an intramuscular injection of Kenalog 80 mg, which she states did not improve her pain at all.  However, patient states that her pain did not start on that date due to the fall but rather she was having pain already prior to this incident.  Patient states that the pain has been severe at times and \"excruciating\", keeping her awake at night.  Patient describes pain that radiates from the posterolateral left hip down to her left knee and sometimes down to her left foot.  Patient also reports some intermittent numbness and tingling symptoms.  The patient was evaluated by her primary care provider, INDERJIT Nielson at [facility] on 5/24/2022 and was prescribed Norco 5 mg for pain control.  The patient was referred to orthopedics at that time for further evaluation.  However, the pain became so severe that she presented to the ED for treatment on 5/30/2022 and she was also hospitalized at that time due to atrial fibrillation with rapid ventricular response.  While she was hospitalized, Dr. Hurtado was consulted for her left hip pain and she was evaluated as an inpatient.  She also had a CT scan of her pelvis to rule out occult fracture, which was negative.  Patient continues to report pain in the left " "hip that radiates down to her left knee.  Pain is described as intermittent and severe.  Pain is described as stabbing in nature with associated numbness and tingling symptoms.  Pain is worse with standing, walking and activity but she also has pain while at rest and states that the pain disrupts her sleep.  Pain improves mildly with rest and pain medication.  Patient does report that she has a history of chronic low back pain due to \"arthritis\".  She states that she has had chronic low back pain for several years with limited activity at times due to this.  AP standing and lateral x-rays of the left knee performed in office today.  X-rays of the lumbar spine also performed in office today.  Pain scale today is 5/10.    CONCURRENT MEDICAL HISTORY:    Past Medical History:   Diagnosis Date   • Anemia    • Diabetes mellitus (HCC)    • History of transfusion    • Hyperlipidemia    • Hypertension    • TIA (transient ischemic attack)        No Known Allergies      Current Outpatient Medications:   •  acetaminophen (TYLENOL) 650 MG 8 hr tablet, Take 650 mg by mouth 3 (Three) Times a Day As Needed., Disp: , Rfl:   •  alendronate (FOSAMAX) 70 MG tablet, Take 70 mg by mouth 1 (One) Time Per Week., Disp: , Rfl:   •  atorvastatin (LIPITOR) 40 MG tablet, Take 1 tablet by mouth Daily for 90 days., Disp: 90 tablet, Rfl: 0  •  calcium citrate-vitamin D (CITRACAL+D) 315-200 MG-UNIT per tablet, Take 1 tablet by mouth 2 (Two) Times a Day., Disp: , Rfl:   •  citalopram (CeleXA) 40 MG tablet, Take 1 tablet by mouth Daily., Disp: , Rfl:   •  dilTIAZem CD (CARDIZEM CD) 180 MG 24 hr capsule, Take 1 capsule by mouth Daily for 90 days., Disp: 90 capsule, Rfl: 2  •  HYDROcodone-acetaminophen (Norco) 7.5-325 MG per tablet, Take 1 tablet by mouth Every 6 (Six) Hours As Needed for Moderate or Severe Pain for up to 18 doses., Disp: 18 tablet, Rfl: 0  •  Lancet Devices (ACCU-CHEK SOFTCLIX) lancets, 1 Units., Disp: , Rfl:   •  metFORMIN " "(GLUCOPHAGE) 500 MG tablet, Take 1,000 mg by mouth 2 (Two) Times a Day., Disp: , Rfl:   •  gabapentin (Neurontin) 300 MG capsule, Take 1 capsule by mouth At Night As Needed (radicular pain left leg)., Disp: 30 capsule, Rfl: 1    Past Surgical History:   Procedure Laterality Date   • COLONOSCOPY N/A 1/17/2019    Procedure: COLONOSCOPY;  Surgeon: Lucio Ni DO;  Location: [facility] ENDOSCOPY;  Service: Gastroenterology   • TUBAL ABDOMINAL LIGATION         History reviewed. No pertinent family history.    Social History     Socioeconomic History   • Marital status:    Tobacco Use   • Smoking status: Former Smoker   • Smokeless tobacco: Never Used   Substance and Sexual Activity   • Alcohol use: No   • Drug use: No   • Sexual activity: Defer        Review of Systems   Constitutional: Positive for unexpected weight change.   HENT: Negative.    Eyes: Negative.    Respiratory: Negative.    Cardiovascular: Positive for palpitations.   Gastrointestinal: Negative.    Endocrine: Negative.    Genitourinary: Negative.    Musculoskeletal: Positive for arthralgias, back pain and gait problem.        Left hip pain. Left leg/knee pain.    Skin: Negative.    Allergic/Immunologic: Negative.    Neurological: Positive for numbness (LLE (intermittent)).   Hematological: Negative.    Psychiatric/Behavioral: Positive for sleep disturbance.       PHYSICAL EXAMINATION:       Ht 167.6 cm (66\")   Wt 71.7 kg (158 lb 1.6 oz)   LMP  (LMP Unknown)   BMI 25.52 kg/m²     Physical Exam  Vitals reviewed.   Constitutional:       General: She is not in acute distress.     Appearance: She is well-developed. She is not ill-appearing.   HENT:      Head: Normocephalic.   Pulmonary:      Effort: Pulmonary effort is normal. No respiratory distress.   Abdominal:      General: There is no distension.      Palpations: Abdomen is soft.   Musculoskeletal:         General: Tenderness (Mild, left hip, lumbar spine) present. No swelling, deformity or " signs of injury.      Lumbar back: Negative right straight leg raise test and negative left straight leg raise test.      Left knee: No effusion.      Instability Tests: Medial Todd test negative and lateral Todd test negative.   Skin:     General: Skin is warm and dry.      Capillary Refill: Capillary refill takes less than 2 seconds.      Findings: No erythema.   Neurological:      Mental Status: She is alert and oriented to person, place, and time.      GCS: GCS eye subscore is 4. GCS verbal subscore is 5. GCS motor subscore is 6.      Sensory: Sensory deficit (LLE) present.   Psychiatric:         Speech: Speech normal.         Behavior: Behavior normal.         Thought Content: Thought content normal.         Judgment: Judgment normal.         GAIT:     []  Normal  [x]  Antalgic    Assistive device: [x]  None  []  Walker     []  Crutches  []  Cane     []  Wheelchair  []  Stretcher    Left Knee Exam     Tenderness   The patient is experiencing no tenderness.     Range of Motion   Extension: 0   Flexion: 120     Tests   Todd:  Medial - negative Lateral - negative  Varus: negative Valgus: negative    Other   Erythema: absent  Sensation: normal  Pulse: present  Swelling: none  Effusion: no effusion present    Comments:  No pain elicited with range of motion of the knee joint.  No swelling or effusion noted.  No visible abnormalities noted.      Left Hip Exam     Tenderness   The patient is experiencing tenderness in the posterior and lateral (Mild).    Range of Motion   Abduction: 35   Flexion: 120   External rotation: 70   Internal rotation: 10     Muscle Strength   The patient has normal left hip strength.     Tests   KATHRINE: negative  Fadir:  Negative FADIR test    Other   Erythema: absent  Sensation: normal  Pulse: present    Comments:  Mild tenderness to the posterolateral hip.  No localized tenderness overlying the greater trochanter.  Overall, the patient has good range of motion of the hip joint  with minimal to no pain elicited.  No swelling appreciated.  No visible abnormalities of the hip noted.      Back Exam     Tenderness   The patient is experiencing tenderness in the lumbar (Mild).    Range of Motion   Extension: abnormal   Flexion: abnormal   Rotation right: abnormal   Rotation left: abnormal     Muscle Strength   The patient has normal back strength.    Tests   Straight leg raise right: negative  Straight leg raise left: negative    Other   Sensation: decreased (LLE)  Gait: antalgic   Erythema: no back redness              XR Spine Lumbar 2 or 3 View    Result Date: 6/9/2022  Narrative: PROCEDURE: Lumbar spine x-rays with three views. INDICATION: left hip/leg pain, chronic low back pain, M25.552 Pain in left hip M51.36 Other intervertebral disc degeneration, lumbar region M54.42 Lumbago with sciatica, left side G89.29 Other chronic pain COMPARISON: None. FINDINGS: Lumbar and visualized lower thoracic vertebrae normal height with normal alignment. No old or acute compression injuries. Advanced degenerative disc changes and hypertrophic degenerative changes L3-4, L4-5 and L5-S1. Pedicles, spinous processes and transverse processes intact. Diffuse arteriosclerotic calcification in wall of the abdominal aorta with maximal AP diameter lateral view 3.6 cm.     Impression: Advanced degenerative disc changes with hypertrophic spurring L3-4, L4-5 and L5-S1. Calcified abdominal aortic wall maximal AP diameter 3.6 cm. [id number] Electronically signed by:  Solis Valverde MD  6/9/2022 8:03 AM CDT Workstation: [id number]    XR Knee 1 or 2 View Left    Result Date: 6/8/2022  Narrative: EXAM: XR KNEE 1-2 VIEWS, XR KNEES ANTEROPOSTERIOR STANDING BILATERAL COMPARISON: None INDICATION: pain, M25.562 Pain in left knee FINDINGS: Single weightbearing view of both knees. Lateral view of the left knee. No acute fracture or dislocation. No suspicious osseous lesion. Joint spaces are grossly preserved. Thin curvilinear calcific  densities are seen within the medial and lateral compartments of the right and left knee. Peaking of the bilateral tibial spines. Small left patellar osteophytes. Atherosclerosis of the bilateral lower extremity vasculature.     Impression: No acute osseous abnormality. Bilateral knee osteoarthritis and chondrocalcinosis. Electronically signed by:  Catalino Michele MD  6/8/2022 2:03 PM CDT Workstation: [id number]    XR Knee Bilateral AP Standing    Result Date: 6/8/2022  Narrative: EXAM: XR KNEE 1-2 VIEWS, XR KNEES ANTEROPOSTERIOR STANDING BILATERAL COMPARISON: None INDICATION: pain, M25.562 Pain in left knee FINDINGS: Single weightbearing view of both knees. Lateral view of the left knee. No acute fracture or dislocation. No suspicious osseous lesion. Joint spaces are grossly preserved. Thin curvilinear calcific densities are seen within the medial and lateral compartments of the right and left knee. Peaking of the bilateral tibial spines. Small left patellar osteophytes. Atherosclerosis of the bilateral lower extremity vasculature.     Impression: No acute osseous abnormality. Bilateral knee osteoarthritis and chondrocalcinosis. Electronically signed by:  Catalino Michele MD  6/8/2022 2:03 PM CDT Workstation: [id number]    CT Pelvis Without Contrast    Result Date: 5/31/2022  Narrative: Procedure: CT pelvis without contrast Reason for exam: Pelvic pain. Stress fracture suspected. Negative x-ray. FINDINGS: Axial computed tomography sequential imaging was performed from the iliac crests through the symphysis pubis without IV contrast administration. Sagittal and coronal reformation was performed. This exam was performed according to our departmental dose optimization program, which includes automated exposure control, adjustment of the mA and/or KV according to patient size and/or use of iterative reconstruction technique. Bony structures of the pelvis are normal. There is no evidence of fracture or  dislocation identified. Soft tissue structures of the pelvis are unremarkable.     Impression: Negative CT pelvis with no acute abnormality identified. Electronically signed by:  Nicanro De Dios MD  5/31/2022 1:03 PM CDT Workstation: [id number]    XR Hip With or Without Pelvis 2 - 3 View Left    Result Date: 5/30/2022  Narrative: Two view left hip with single view pelvis HISTORY: Chronic left hip pain. Left hip pain x1 month. AP and lateral views obtained. COMPARISON: None FINDINGS: No acute fracture or dislocation. Old 8 mm avulsion left inferior pubic ramus. Smoothly marginated 2.5 cm oval bony density medial to the right femoral neck, possible old fracture fragment or possible synovial osteochondroma. No joint space narrowing. Minimal degenerative change each iliac crest. Degenerative disc disease L4-5 and L5-S1. No other osseous or articular abnormality. Pelvic phleboliths. Atherosclerotic calcification iliac and femoral arteries. Gluteal calcifications.     Impression: CONCLUSION: Old 8 mm avulsion left inferior pubic ramus. Smoothly marginated 2.5 cm oval bony density medial to the right femoral neck, possible old fracture fragment or possible synovial osteochondroma. Minimal degenerative change each iliac crest. Degenerative disc disease L4-5 and L5-S1. [id number] Electronically signed by:  Sami Osuna MD  5/30/2022 11:55 AM CDT Workstation: [id number]      ASSESSMENT:    Diagnoses and all orders for this visit:    Left hip pain  -     XR Spine Lumbar 2 or 3 View  -     MRI Hip Left Without Contrast; Future    Acute pain of left knee    Injury due to fall, initial encounter  -     MRI Hip Left Without Contrast; Future    Primary osteoarthritis of left knee    Chondrocalcinosis of left knee    DDD (degenerative disc disease), lumbar  -     XR Spine Lumbar 2 or 3 View  -     gabapentin (Neurontin) 300 MG capsule; Take 1 capsule by mouth At Night As Needed (radicular pain left leg).  -     MRI Lumbar Spine Without  Contrast; Future    Chronic left-sided low back pain with left-sided sciatica  -     XR Spine Lumbar 2 or 3 View  -     gabapentin (Neurontin) 300 MG capsule; Take 1 capsule by mouth At Night As Needed (radicular pain left leg).  -     MRI Lumbar Spine Without Contrast; Future    Radicular pain of left lower extremity  -     gabapentin (Neurontin) 300 MG capsule; Take 1 capsule by mouth At Night As Needed (radicular pain left leg).  -     MRI Lumbar Spine Without Contrast; Future    Numbness and tingling of left leg  -     gabapentin (Neurontin) 300 MG capsule; Take 1 capsule by mouth At Night As Needed (radicular pain left leg).  -     MRI Lumbar Spine Without Contrast; Future    PLAN    X-rays of the left hip/pelvis performed on 5/30/2022 are independently reviewed by myself today with no acute findings noted.  The patient has mild degenerative changes but nothing significant and overall joint spacing in the hip is well-maintained.  AP standing x-ray of the bilateral knees with a lateral x-ray of the left knee performed in office today and reviewed with no acute findings noted.  The patient has mild to moderate degenerative changes noted in the bilateral knees with evidence of chondrocalcinosis.  X-rays of the lumbar spine are also performed in office today and reviewed with no acute findings noted.  Patient has advanced degenerative changes in the lower lumbar spine with significant degenerative disc disease, significant narrowing of the disc spaces and facet arthropathy.  Patient complains of acute left hip pain that radiates down to her left knee but she also describes pain that radiates down to her left foot at times as well as intermittent numbness and tingling symptoms throughout the left lower extremity.  She has a long history of chronic low back pain as well as a history of activity intolerance due to her back pain.  On physical exam, she has good range of motion of her hip and knee joints with no pain  elicited.  Subjective complaints and physical exam are most consistent with suspected lumbar radiculopathy.  We discussed that I suspect she is having issues with possible nerve compression affecting the left lower extremity due to her extensive degenerative changes in the lumbar spine.  Unfortunately, she did not get any relief from an intramuscular injection of Kenalog 80 mg given recently by another provider.  She does state that the pain has been more tolerable with pain medication that was ordered by her PCP.  Recommend MRI of the lumbar spine to evaluate for disc herniation, spinal stenosis, foraminal stenosis and/or nerve compression.  Recommend MRI of the left hip to evaluate for occult fracture or any evidence of soft tissue abnormalities that would account for her pain.  She is not having any significant difficulty bearing weight or ambulating so suspicion for fracture is quite low, although it is noted that she does have a history of known osteoporosis.    Recommend the following:    -Rest and activity modification for now with avoidance of straining, lifting, pulling, tugging, etc.  -Use of ice therapy and/or heat therapy to the low back to minimize pain/inflammation/muscle tension.  -Use of a cane or walker for modified weightbearing from the lumbar spine.  -Continue with Norco 5 mg as needed for pain control as prescribed by her PCP.  Patient can also take Tylenol as needed for additional pain control but should not exceed more than 4000 mg of acetaminophen in a 24-hour period.  The patient cannot take oral NSAIDs due to her history of chronic kidney disease-stage III.    Neurontin 300 mg is also prescribed for the patient to take at bedtime to see if this improves her radicular pain/symptoms affecting the left leg.  Patient is cautioned that Neurontin will likely cause drowsiness so we discussed starting it at bedtime will minimize potential adverse side effects.  We discussed that if the medication  does not offer any relief or benefit, then it can be discontinued at her discretion.  LISBETH is reviewed internally via Epic and is noted be appropriate.    Follow-up after MRIs to discuss results and further treatment recommendations.  Consider referral to physical therapy.  Consider trial of a lumbar epidural injection of steroid if indicated.  Consider referral to neurosurgery if indicated.    Time spent of a minimum of 30 minutes including the face to face evaluation, reviewing of medical history and prior medial records, reviewing of diagnostic studies, ordering additional tests, prescription drug management, documentation, patient education and coordination of care.     EMR Dragon/Hopscotch Disclaimer: Some of this note may be an electronic transcription/translation of spoken language to printed text using the Dragon Dictation System.     Return for follow up after MRI.        This document has been electronically signed by INDERJIT Robles on June 8, 2022 14:24 CDT      INDERJIT Robles

## 2022-06-07 PROBLEM — R20.0 NUMBNESS AND TINGLING OF LEFT LEG: Status: ACTIVE | Noted: 2022-06-07

## 2022-06-07 PROBLEM — R20.2 NUMBNESS AND TINGLING OF LEFT LEG: Status: ACTIVE | Noted: 2022-06-07

## 2022-06-07 PROBLEM — M54.10 RADICULAR PAIN OF LEFT LOWER EXTREMITY: Status: ACTIVE | Noted: 2022-06-07

## 2022-06-17 ENCOUNTER — OFFICE VISIT (OUTPATIENT)
Dept: CARDIOLOGY | Facility: CLINIC | Age: 77
End: 2022-06-17

## 2022-06-17 VITALS
BODY MASS INDEX: 25.39 KG/M2 | WEIGHT: 158 LBS | HEIGHT: 66 IN | OXYGEN SATURATION: 98 % | DIASTOLIC BLOOD PRESSURE: 72 MMHG | HEART RATE: 78 BPM | SYSTOLIC BLOOD PRESSURE: 126 MMHG

## 2022-06-17 DIAGNOSIS — I48.91 ATRIAL FIBRILLATION, UNSPECIFIED TYPE: Primary | ICD-10-CM

## 2022-06-17 DIAGNOSIS — I10 HYPERTENSION, UNSPECIFIED TYPE: ICD-10-CM

## 2022-06-17 PROCEDURE — 99204 OFFICE O/P NEW MOD 45 MIN: CPT | Performed by: INTERNAL MEDICINE

## 2022-06-17 PROCEDURE — 93000 ELECTROCARDIOGRAM COMPLETE: CPT | Performed by: INTERNAL MEDICINE

## 2022-06-17 RX ORDER — DILTIAZEM HYDROCHLORIDE 180 MG/1
180 CAPSULE, EXTENDED RELEASE ORAL DAILY
COMMUNITY
End: 2022-06-17 | Stop reason: ALTCHOICE

## 2022-06-17 RX ORDER — DOXYCYCLINE HYCLATE 50 MG/1
324 CAPSULE, GELATIN COATED ORAL
COMMUNITY
Start: 2022-05-09

## 2022-06-17 RX ORDER — OMEPRAZOLE 20 MG/1
20 CAPSULE, DELAYED RELEASE ORAL DAILY PRN
COMMUNITY
Start: 2022-05-02

## 2022-06-17 RX ORDER — DICLOFENAC SODIUM 75 MG/1
TABLET, DELAYED RELEASE ORAL
COMMUNITY
Start: 2022-05-09 | End: 2022-08-31 | Stop reason: ALTCHOICE

## 2022-06-17 RX ORDER — LATANOPROST 50 UG/ML
1 SOLUTION/ DROPS OPHTHALMIC NIGHTLY
COMMUNITY
Start: 2022-05-20

## 2022-06-17 NOTE — PROGRESS NOTES
[facility]  OFFICE NOTE    Cardiovascular Medicine  Karl Barnett M.D., [facility]         No referring provider defined for this encounter.    Thank you for asking me to see Shivani Galvez for atrial fibrillation.    History of Present Illness    Shivani Galvez is a 77 y.o. female who presents for consultation today.  She has had diabetes mellitus, dyslipidemia, GERD, hypertension per prior documentation.  She reports having a mini stroke several years ago, she had visual difficulties (double vision) at this time which resolved.    She denies having any prior history of coronary stents, open heart surgeries, pacemaker/defibrillator placement, heart murmurs or heart valve problems before.    Around the end of May 2022, she was hospitalized for left hip pain.  During that admission, she was diagnosed with atrial fibrillation, this was a new diagnosis for her.  Cardiology consultation was not requested during that admission.  She was not started on anticoagulation because of history of bleeding in 2019 that required blood transfusion.  She reports not having any bleeding events since then.    The patient denies having any chest discomfort or dyspnea during her routine day-to-day activities.  She also denies having any palpitations, heart racing, irregular heartbeat or skipped heartbeat.  She has not had any dizziness, lightheadedness, presyncope or syncope.  She also denies having any PND or orthopnea.    Review of Systems - ROS  Constitution: Negative for weakness, weight gain and weight loss.   HENT: Negative for congestion.    Eyes: Negative for blurred vision.   Cardiovascular: As mentioned above  Respiratory: Negative for cough and hemoptysis.    Endocrine: Negative for polydipsia and polyuria.   Hematologic/Lymphatic: Negative for  active bleeding problem. Does not bruise/bleed easily.   Skin: Negative for flushing.   Musculoskeletal: Negative for neck pain and  stiffness.  Positive for back pain and left lower extremity pain  Gastrointestinal: Negative for abdominal pain, diarrhea, jaundice, melena, nausea and vomiting.   Genitourinary: Negative for dysuria and hematuria.   Neurological: Negative for dizziness, focal weakness and numbness.   Psychiatric/Behavioral: Negative for altered mental status and depression.      All other systems were reviewed and were negative.    Past Medical History:   Diagnosis Date   • Anemia    • Diabetes mellitus (HCC)    • History of transfusion    • Hyperlipidemia    • Hypertension    • TIA (transient ischemic attack)        Family History:  family history is not on file.    Social History: She started smoking at the age of 43 years (2 PPD) and quit in 2015.    reports that she quit smoking about 7 years ago. She smoked 2.00 packs per day. She has never used smokeless tobacco. She reports that she does not drink alcohol and does not use drugs.    Allergies:  Allergies   Allergen Reactions   • Asa [Aspirin] Other (See Comments)     Bleeding          Current Outpatient Medications:   •  acetaminophen (TYLENOL) 650 MG 8 hr tablet, Take 650 mg by mouth 3 (Three) Times a Day As Needed., Disp: , Rfl:   •  alendronate (FOSAMAX) 70 MG tablet, Take 70 mg by mouth 1 (One) Time Per Week., Disp: , Rfl:   •  atorvastatin (LIPITOR) 40 MG tablet, Take 1 tablet by mouth Daily for 90 days., Disp: 90 tablet, Rfl: 0  •  calcium citrate-vitamin D (CITRACAL+D) 315-200 MG-UNIT per tablet, Take 1 tablet by mouth 2 (Two) Times a Day., Disp: , Rfl:   •  citalopram (CeleXA) 40 MG tablet, Take 1 tablet by mouth Daily., Disp: , Rfl:   •  diclofenac (VOLTAREN) 75 MG EC tablet, , Disp: , Rfl:   •  dilTIAZem CD (CARDIZEM CD) 180 MG 24 hr capsule, Take 1 capsule by mouth Daily for 90 days., Disp: 90 capsule, Rfl: 2  •  empagliflozin (JARDIANCE) 25 MG tablet tablet, Take 25 mg by mouth Daily., Disp: , Rfl:   •  ferrous gluconate (FERGON) 324 MG tablet, , Disp: , Rfl:   •   "gabapentin (Neurontin) 300 MG capsule, Take 1 capsule by mouth At Night As Needed (radicular pain left leg)., Disp: 30 capsule, Rfl: 1  •  HYDROcodone-acetaminophen (Norco) 7.5-325 MG per tablet, Take 1 tablet by mouth Every 6 (Six) Hours As Needed for Moderate or Severe Pain for up to 18 doses., Disp: 18 tablet, Rfl: 0  •  Lancet Devices (ACCU-CHEK SOFTCLIX) lancets, 1 Units., Disp: , Rfl:   •  latanoprost (XALATAN) 0.005 % ophthalmic solution, , Disp: , Rfl:   •  metFORMIN (GLUCOPHAGE) 500 MG tablet, Take 1,000 mg by mouth 2 (Two) Times a Day., Disp: , Rfl:   •  omeprazole (priLOSEC) 20 MG capsule, Take 20 mg by mouth Daily., Disp: , Rfl:   •  vitamin D3 125 MCG (5000 UT) capsule capsule, Take 1 capsule by mouth Daily., Disp: , Rfl:   •  apixaban (ELIQUIS) 5 MG tablet tablet, Take 1 tablet by mouth 2 (Two) Times a Day for 90 days., Disp: 180 tablet, Rfl: 0    Physical Exam:  Vitals:    06/17/22 0944   BP: 126/72   BP Location: Left arm   Patient Position: Sitting   Cuff Size: Adult   Pulse: 78   SpO2: 98%   Weight: 71.7 kg (158 lb)   Height: 167.6 cm (66\")   PainSc: 0-No pain     Current Pain Level: none  Pulse Ox: Normal  on room air  General: alert, appears stated age and cooperative     Body Habitus: Borderline overweight  HEENT: Head: Normocephalic, no lesions, without obvious abnormality.     Neuro: alert, oriented x3  JVP: Volume/Pulsation: Normal.  Normal waveforms.   Appropriate inspiratory decrease.     Carotid Exam: Possible radiation of cardiac murmur to the carotids; bilateral carotid bruits cannot be ruled out   Carotid Volume: normal.     Respirations: no increased work of breathing   Chest:  Normal    Pulmonary:Normal   Heart rate: normal    Heart Rhythm: Irregularly irregular     Heart Sounds: S1: normal  S2: normal  Systolic murmur heard at bilateral upper sternal borders with possible radiation to carotids  Abdomen:   Appearance: normal .  Palpation: Soft, non-tender to palpation, bowel sounds " positive in all four quadrants  Extremity: no edema.       DATA REVIEWED:     EKG. I personally reviewed and interpreted the EKG.  atrial fibrillation w/ controlled ventricular response, septal infarct, nonspecific T wave normality on 6/17/2022    ECG/EMG Results (all)     Procedure Component Value Units Date/Time    ECG 12 Lead [[id number]] Collected: 06/17/22 0943     Updated: 06/17/22 0946     QT Interval 354 ms      QTC Interval 403 ms     Narrative:      Test Reason : a fib  Blood Pressure :   */*   mmHG  Vent. Rate :  78 BPM     Atrial Rate :  75 BPM     P-R Int :   * ms          QRS Dur :  88 ms      QT Int : 354 ms       P-R-T Axes :   *  71 100 degrees     QTc Int : 403 ms    Atrial fibrillation  T wave abnormality, consider lateral ischemia or digitalis effect  Abnormal ECG  When compared with ECG of 30-MAY-2022 10:09,  Vent. rate has decreased BY  64 BPM  Nonspecific T wave abnormality now evident in Inferior leads  T wave inversion now evident in Lateral leads    Referred By: MABEL           Confirmed By:         ---------------------------------------------------  TTE/RUBEN:  Results for orders placed during the hospital encounter of 05/30/22    Adult Transthoracic Echo Complete W/ Cont if Necessary Per Protocol    Interpretation Summary  · Left ventricular wall thickness is consistent with mild to moderate concentric hypertrophy.  · Left ventricular ejection fraction appears to be 51 - 55%.  · Left ventricular diastolic function was normal.  · There is moderate calcification of the aortic valve mainly affecting the non-coronary and right coronary cusp(s).      -----------------------------------------------------  CXR/Imaging:   Imaging Results (Most Recent)     None          -----------------------------------------------------  CT:   XR Spine Lumbar 2 or 3 View    Result Date: 6/9/2022  Advanced degenerative disc changes with hypertrophic spurring L3-4, L4-5 and L5-S1. Calcified abdominal aortic wall  maximal AP diameter 3.6 cm. [id number] Electronically signed by:  Solis Valverde MD  6/9/2022 8:03 AM CDT Workstation: [id number]    XR Knee 1 or 2 View Left    Result Date: 6/8/2022  No acute osseous abnormality. Bilateral knee osteoarthritis and chondrocalcinosis. Electronically signed by:  Catalino Michele MD  6/8/2022 2:03 PM CDT Workstation: [id number]    XR Knee Bilateral AP Standing    Result Date: 6/8/2022  No acute osseous abnormality. Bilateral knee osteoarthritis and chondrocalcinosis. Electronically signed by:  Catalino Michele MD  6/8/2022 2:03 PM CDT Workstation: [id number]    CT Pelvis Without Contrast    Result Date: 5/31/2022  Negative CT pelvis with no acute abnormality identified. Electronically signed by:  Nicanor De Dios MD  5/31/2022 1:03 PM CDT Workstation: [id number]    XR Chest 1 View    Result Date: 5/30/2022  CONCLUSION: No Acute Disease [id number] Electronically signed by:  Sami Osuna MD  5/30/2022 11:49 AM CDT Workstation: [id number]    MRI Hip Left Without Contrast    Result Date: 6/16/2022  1.  The signal change in the adductor and obturator muscles of the left hip suggests recent injury and historical correlation is needed. 2.  The signal change in the left gluteus medius muscle may also be from recent injury. 3.  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 4.  The right hamstring attachment on the ischial tuberosity has changes of partial tearing that may be late subacute or old. 5.  Extensive signal change in the right gluteus sal.  While some of this may be due to injury, denervation and developing atrophy are possibilities and MRI of the L-spine is recommended. [id number]    XR Hip With or Without Pelvis 2 - 3 View Left    Result Date: 5/30/2022  CONCLUSION: Old 8 mm avulsion left inferior pubic ramus. Smoothly marginated 2.5 cm oval bony density medial to the right femoral neck, possible old fracture fragment or  possible synovial osteochondroma. Minimal degenerative change each iliac crest. Degenerative disc disease L4-5 and L5-S1. [id number] Electronically signed by:  Sami Osuna MD  5/30/2022 11:55 AM CDT Workstation: [id number]    MRI Cyberknife Lumbar Spine Without Contrast    Result Date: 6/16/2022  1.  There is compression of the left L3 nerve root due to a disc fragment in the left lateral recess of L3.  Some of what is seen may be epidural scar, and if there are symptoms referable to the left L3 nerve root, gadolinium images are recommended.  2.  Possible compression of the right L4 nerve root as it enters the right lateral recess of L4 and again within the right neural foramen at the L4-5 level. 3.  Mild to moderate spinal stenosis at the L4-5 level. 4.  Possible compression of the right L5 nerve root at the entrance to the right lateral recess of L5 and also within the right L5-S1 neural foramen. 5.  Left foraminal narrowing at the L5-S1 level may be associated with compression of the left L5 nerve root. 6.  Borderline abdominal aortic aneurysm.  Ultrasonic follow-up in 1 year. [id number]      ----------------------------------------------------      --------------------------------------------------------------------------------------------------  LABS:     The CVD Risk score (JEMMA'Jrino, et al., 2008) failed to calculate for the following reasons:    The 2008 CVD risk score is only valid for ages 30 to 74         Lab Results   Component Value Date    GLUCOSE 192 (H) 06/01/2022    BUN 17 06/01/2022    CREATININE 1.05 (H) 06/01/2022    EGFRIFNONA 51 01/03/2019    BCR 16.2 06/01/2022    K 4.6 06/01/2022    CO2 20.0 (L) 06/01/2022    CALCIUM 9.8 06/01/2022    ALBUMIN 4.50 05/30/2022    LABIL2 1.7 01/06/2019    AST 18 05/30/2022    ALT 16 05/30/2022     Lab Results   Component Value Date    WBC 10.49 06/01/2022    HGB 10.6 (L) 06/01/2022    HCT 35.7 06/01/2022    MCV 76.6 (L) 06/01/2022     06/01/2022     Lab  "Results   Component Value Date    CHOL 135 03/25/2021    CHLPL 104 05/02/2022    TRIG 178 (H) 05/02/2022    HDL 41 05/02/2022    LDL 27 05/02/2022     Lab Results   Component Value Date    TSH 1.36 05/02/2022     Lab Results   Component Value Date    CKTOTAL 64 05/30/2022    TROPONINT <0.010 05/30/2022     Lab Results   Component Value Date    HGBA1C 9.5 (H) 05/02/2022     No results found for: DDIMER  Lab Results   Component Value Date    ALT 16 05/30/2022     Lab Results   Component Value Date    HGBA1C 9.5 (H) 05/02/2022    HGBA1C 7.8 (H) 03/25/2021    HGBA1C 7.9 (H) 09/25/2020     Lab Results   Component Value Date    CREATININE 1.05 (H) 06/01/2022     Lab Results   Component Value Date    IRON 16 (L) 01/03/2019    TIBC 460 01/03/2019    FERRITIN 9 01/03/2019     Lab Results   Component Value Date    INR 0.99 05/30/2022    INR 1.02 01/03/2019    PROTIME 12.9 05/30/2022    PROTIME 13.2 01/03/2019       [unfilled]    1. Atrial fibrillation, unspecified type (HCC)  She was diagnosed with atrial fibrillation during a hospitalization for left hip discomfort in May 2022.  At that time, she was not started on anticoagulation due to history of GI bleed in 2019.  She was started on diltiazem for rate control.  She appears to be in atrial fibrillation with controlled ventricular response on ECG today.  Her [id number] score is at least 5 (age, sex, hypertension, diabetes mellitus).  She also reports having a \"mini-stroke\" several years ago that caused her to have double vision which resolved.  Serum TSH was normal in May 2022.  She had a transthoracic echocardiogram in May 2022, LV systolic function was normal, left atrium was mildly dilated, RV/RA sizes were normal, aortic valve calcification with possible mild aortic stenosis, trace to mild MR were noted.  I discussed the risks (bleeding including life-threatening bleeding), benefits (reduction in stroke risk) and alternatives (baby aspirin therapy, consideration " of JOANN occlusion device) with the patient today and she preferred initiation of anticoagulation based on our discussion today.  Will initiate Eliquis 5 mg orally twice daily.  Continue diltiazem for rate control.  She does not appear to be symptomatic from atrial fibrillation at this point.  Will discuss cardioversion at her next visit in 1 month depending on her response to anticoagulation therapy.  - ECG 12 Lead  - apixaban (ELIQUIS) 5 MG tablet tablet; Take 1 tablet by mouth 2 (Two) Times a Day for 90 days.  Dispense: 180 tablet; Refill: 0    2. Hypertension, unspecified type  Clinic BP was within normal limits today.  She is on diltiazem.  No changes were made to her antihypertensive medication regimen today.    Possible bilateral carotid bruits (?radiated cardiac murmur) were heard on auscultation today.  Consider obtaining carotid duplex at future visits.      Prevention:  BMI is >= 25 and <30. (Overweight) The following options were offered after discussion;: referral to primary care      Shivaniblanca Overton Lenny  reports that she quit smoking about 7 years ago. She smoked 2.00 packs per day. She has never used smokeless tobacco.. I have educated her on continued tobacco cessation.      Return in about 4 weeks (around 7/15/2022).            Electronically signed by Karl Barnett MD on 06/17/22 at 09:48 CDT

## 2022-06-19 LAB
QT INTERVAL: 354 MS
QTC INTERVAL: 403 MS

## 2022-06-21 ENCOUNTER — OFFICE VISIT (OUTPATIENT)
Dept: ORTHOPEDIC SURGERY | Facility: CLINIC | Age: 77
End: 2022-06-21

## 2022-06-21 VITALS — BODY MASS INDEX: 26.2 KG/M2 | HEIGHT: 66 IN | WEIGHT: 163 LBS

## 2022-06-21 DIAGNOSIS — G89.29 CHRONIC LEFT-SIDED LOW BACK PAIN WITH LEFT-SIDED SCIATICA: ICD-10-CM

## 2022-06-21 DIAGNOSIS — M54.42 CHRONIC LEFT-SIDED LOW BACK PAIN WITH LEFT-SIDED SCIATICA: ICD-10-CM

## 2022-06-21 DIAGNOSIS — M54.10 RADICULAR PAIN OF LEFT LOWER EXTREMITY: ICD-10-CM

## 2022-06-21 DIAGNOSIS — M51.36 DDD (DEGENERATIVE DISC DISEASE), LUMBAR: ICD-10-CM

## 2022-06-21 DIAGNOSIS — M25.552 LEFT HIP PAIN: Primary | ICD-10-CM

## 2022-06-21 DIAGNOSIS — M54.16 COMPRESSION OF LUMBAR NERVE ROOT: ICD-10-CM

## 2022-06-21 DIAGNOSIS — R20.0 NUMBNESS AND TINGLING OF LEFT LEG: ICD-10-CM

## 2022-06-21 DIAGNOSIS — R20.2 NUMBNESS AND TINGLING OF LEFT LEG: ICD-10-CM

## 2022-06-21 DIAGNOSIS — M48.061 FORAMINAL STENOSIS OF LUMBAR REGION: ICD-10-CM

## 2022-06-21 PROCEDURE — 99214 OFFICE O/P EST MOD 30 MIN: CPT | Performed by: NURSE PRACTITIONER

## 2022-06-21 RX ORDER — HYDROCODONE BITARTRATE AND ACETAMINOPHEN 7.5; 325 MG/1; MG/1
1 TABLET ORAL EVERY 6 HOURS PRN
Qty: 30 TABLET | Refills: 0 | Status: SHIPPED | OUTPATIENT
Start: 2022-06-21 | End: 2022-07-01

## 2022-06-21 NOTE — PROGRESS NOTES
"Shivani Galvez is a 77 y.o. female returns for     Chief Complaint   Patient presents with   • Lumbar Spine - Follow-up, Pain   • Left Hip - Follow-up, Pain   • Results     HISTORY OF PRESENT ILLNESS: Patient presents to office for follow-up of acute left hip/left leg/left knee pain and to discuss MRI results of the left hip and lumbar spine.  Onset of pain occurred approximately 2-1/2 months ago.  The patient was initially evaluated at [facility] on 4/30/2022 after sustaining a fall on that date when her left knee \"buckled\".  However, she states she was already having pain that affected her left leg prior to her fall.  The patient has had prior x-rays of the left knee and left hip, which were negative for bony injuries.  She was given an intramuscular injection of Kenalog 80 mg, which did not offer any pain improvement.  The patient does have a history of chronic low back pain for several years that she attributes to \"arthritis\" and states that her chronic low back pain limits her activity at times.  The patient has also had CT imaging of the left hip to rule out occult fracture, which was negative.  The patient established care with orthopedics on 6/6/2022 and her symptoms were felt to be most consistent with lumbar radiculopathy.  The patient has continued to take Norco 7.5 mg for pain control and requests a refill today.  The patient was also started on Neurontin 300 mg to take at bedtime at last office visit, which she continues to take and states it does seem to offer some improvement.  Patient continues to experience persistent left lower extremity pain that she describes as aching in nature but overall states she has had some improvement since her last office visit.  Patient continues to complain of intermittent numbness and tingling symptoms in the left leg as well as progressively worsening weakness.  She states that her left leg gives out when ambulating and she also has to passively lift her " "left leg at times to cross her legs and get in and out of a vehicle.  No new complaints or concerns noted since last office visit.  No new falls or injuries reported since last office visit.  Pain scale today is 3/10.     CONCURRENT MEDICAL HISTORY:    The following portions of the patient's history were reviewed and updated as appropriate: allergies, current medications, past family history, past medical history, past social history, past surgical history and problem list.     ROS  No fevers or chills.  No chest pain or shortness of air.  No GI or  disturbances. Low back pain. Left leg pain. Left leg weakness.     PHYSICAL EXAMINATION:       Ht 167.6 cm (66\")   Wt 73.9 kg (163 lb)   LMP  (LMP Unknown)   BMI 26.31 kg/m²     Physical Exam  Vitals reviewed.   Constitutional:       General: She is not in acute distress.     Appearance: She is well-developed. She is not ill-appearing.   HENT:      Head: Normocephalic.   Pulmonary:      Effort: Pulmonary effort is normal. No respiratory distress.   Abdominal:      General: There is no distension.      Palpations: Abdomen is soft.   Musculoskeletal:         General: Tenderness (Mild, lumbar spine, left hip) present. No swelling, deformity or signs of injury.      Lumbar back: Negative right straight leg raise test and negative left straight leg raise test.      Left knee: No effusion.      Instability Tests: Medial Todd test negative and lateral Todd test negative.   Skin:     General: Skin is warm and dry.      Capillary Refill: Capillary refill takes less than 2 seconds.      Findings: No erythema.   Neurological:      Mental Status: She is alert and oriented to person, place, and time.      GCS: GCS eye subscore is 4. GCS verbal subscore is 5. GCS motor subscore is 6.      Sensory: Sensory deficit (LLE) present.      Motor: Weakness (LLE) present.      Gait: Gait abnormal.   Psychiatric:         Speech: Speech normal.         Behavior: Behavior normal.    "      Thought Content: Thought content normal.         Judgment: Judgment normal.         GAIT:     []  Normal  [x]  Antalgic    Assistive device: [x]  None  []  Walker     []  Crutches  []  Cane     []  Wheelchair  []  Stretcher    Left Knee Exam     Tenderness   The patient is experiencing no tenderness.     Range of Motion   Extension: 0   Flexion: 120     Tests   Todd:  Medial - negative Lateral - negative  Varus: negative Valgus: negative    Other   Erythema: absent  Sensation: normal  Pulse: present  Swelling: none  Effusion: no effusion present    Comments:  No pain elicited with range of motion of the knee joint.  No swelling or effusion noted.  No visible abnormalities noted.      Left Hip Exam     Tenderness   The patient is experiencing tenderness in the posterior and lateral (Mild).    Range of Motion   Abduction: 35   Flexion: 120   External rotation: 70   Internal rotation: 10     Muscle Strength   The patient has normal left hip strength.     Tests   KATHRINE: negative  Fadir:  Negative FADIR test    Other   Erythema: absent  Sensation: normal  Pulse: present    Comments:  Mild tenderness to the posterolateral hip.  No localized tenderness overlying the greater trochanter.  Overall, the patient has good range of motion of the hip joint with minimal to no pain elicited.  No swelling appreciated.  No visible abnormalities of the hip noted.      Back Exam     Tenderness   The patient is experiencing tenderness in the lumbar (Mild).    Range of Motion   Extension: abnormal   Flexion: abnormal   Rotation right: abnormal   Rotation left: abnormal     Muscle Strength   Right Quadriceps:  4/5   Left Quadriceps:  4/5     Tests   Straight leg raise right: negative  Straight leg raise left: negative    Other   Sensation: decreased (LLE)  Gait: antalgic   Erythema: no back redness              XR Spine Lumbar 2 or 3 View    Result Date: 6/9/2022  Narrative: PROCEDURE: Lumbar spine x-rays with three views.  INDICATION: left hip/leg pain, chronic low back pain, M25.552 Pain in left hip M51.36 Other intervertebral disc degeneration, lumbar region M54.42 Lumbago with sciatica, left side G89.29 Other chronic pain COMPARISON: None. FINDINGS: Lumbar and visualized lower thoracic vertebrae normal height with normal alignment. No old or acute compression injuries. Advanced degenerative disc changes and hypertrophic degenerative changes L3-4, L4-5 and L5-S1. Pedicles, spinous processes and transverse processes intact. Diffuse arteriosclerotic calcification in wall of the abdominal aorta with maximal AP diameter lateral view 3.6 cm.     Impression: Advanced degenerative disc changes with hypertrophic spurring L3-4, L4-5 and L5-S1. Calcified abdominal aortic wall maximal AP diameter 3.6 cm. [id number] Electronically signed by:  Solis Valverde MD  6/9/2022 8:03 AM CDT Workstation: [id number]    MRI Hip Left Without Contrast    Result Date: 6/16/2022  Narrative: Indication:  Pain in low back and left hip. MRI, Left Hip without Gadolinium:  The bony pelvis does not have any marrow edema or other abnormality.  The SI joints appear normal.  In the anterior left femoral head, there are changes of old avascular necrosis; no cortical collapse.  No marrow edema is seen in the left femoral head and no joint effusion. There is signal change in the superficial left gluteus medius muscle that is best seen on the FLAIR images; no focal hematoma.  The obturator muscles and adductor muscles have mild increased signal.  The tendon attachments on the left hip are unremarkable.  On the right, there is extensive signal change in the gluteus sal muscle and there are small fluid collections at the hamstring attachment on the right ischial tuberosity.    Impression: 1.  The signal change in the adductor and obturator muscles of the left hip suggests recent injury and historical correlation is needed. 2.  The signal change in the left gluteus medius  muscle may also be from recent injury. 3.  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 4.  The right hamstring attachment on the ischial tuberosity has changes of partial tearing that may be late subacute or old. 5.  Extensive signal change in the right gluteus sal.  While some of this may be due to injury, denervation and developing atrophy are possibilities and MRI of the L-spine is recommended. [id number]    XR Hip With or Without Pelvis 2 - 3 View Left    Result Date: 5/30/2022  Narrative: Two view left hip with single view pelvis HISTORY: Chronic left hip pain. Left hip pain x1 month. AP and lateral views obtained. COMPARISON: None FINDINGS: No acute fracture or dislocation. Old 8 mm avulsion left inferior pubic ramus. Smoothly marginated 2.5 cm oval bony density medial to the right femoral neck, possible old fracture fragment or possible synovial osteochondroma. No joint space narrowing. Minimal degenerative change each iliac crest. Degenerative disc disease L4-5 and L5-S1. No other osseous or articular abnormality. Pelvic phleboliths. Atherosclerotic calcification iliac and femoral arteries. Gluteal calcifications.     Impression: CONCLUSION: Old 8 mm avulsion left inferior pubic ramus. Smoothly marginated 2.5 cm oval bony density medial to the right femoral neck, possible old fracture fragment or possible synovial osteochondroma. Minimal degenerative change each iliac crest. Degenerative disc disease L4-5 and L5-S1. [id number] Electronically signed by:  Sami Osuna MD  5/30/2022 11:55 AM CDT Workstation: [id number]    MRI Cyberknife Lumbar Spine Without Contrast    Result Date: 6/16/2022  Narrative: Indication:  Pain in low back and left hip, abnormal MRI of left hip. MRI, L-spine without Gadolinium:  The lumbar vertebrae have a normal signal and appearance.  The L1-2 disc has a normal appearance. At the L2-3 level, there is a broad-based left  disc protrusion with a component that has migrated into the left lateral recess of L3 with compression of the left L3 nerve root.  There is a large disc protrusion that extends into the lower left neural foramen but does not appear to compress the left L2 nerve root. At the L3-4 level, there is a diffuse bulge of the annulus, and in addition, there is an old central-right disc protrusion that has a component that extends behind the superior endplate of L4 with uncertain impact on the right L4 nerve root.  There is mild spinal stenosis. At the L4-5 level, there is degenerative change in the disc space with a diffuse bulge of the annulus and an old irregular central disc protrusion which, in combination with thickening of the posterior ligaments, creates mild to moderate spinal stenosis.   There is narrowing at the entrance to the right lateral recess of L5 with uncertain impact on the right L5 nerve root. Also at the L4-5 level is an old far right disc protrusion that encroaches on the neural foramen with uncertain impact on the right L4 nerve root. At the L5-S1 level, there is degenerative change in the disc space that is associated with bilateral foraminal narrowing with uncertain impact on the L5 nerve roots.  In addition, there is narrowing of the entrance to the right lateral recess of S1 with uncertain but doubtful impact on the right S1 nerve root. The conus has a normal position and appearance.  The lower abdominal aorta is ectatic and has a cross-section of 3.0 x 2.8 cm.  Otherwise, the retroperitoneum is unremarkable.    Impression: 1.  There is compression of the left L3 nerve root due to a disc fragment in the left lateral recess of L3.  Some of what is seen may be epidural scar, and if there are symptoms referable to the left L3 nerve root, gadolinium images are recommended.  2.  Possible compression of the right L4 nerve root as it enters the right lateral recess of L4 and again within the right neural  foramen at the L4-5 level. 3.  Mild to moderate spinal stenosis at the L4-5 level. 4.  Possible compression of the right L5 nerve root at the entrance to the right lateral recess of L5 and also within the right L5-S1 neural foramen. 5.  Left foraminal narrowing at the L5-S1 level may be associated with compression of the left L5 nerve root. 6.  Borderline abdominal aortic aneurysm.  Ultrasonic follow-up in 1 year. [id number]    XR Knee 1 or 2 View Left     Result Date: 6/8/2022  Narrative: EXAM: XR KNEE 1-2 VIEWS, XR KNEES ANTEROPOSTERIOR STANDING BILATERAL COMPARISON: None INDICATION: pain, M25.562 Pain in left knee FINDINGS: Single weightbearing view of both knees. Lateral view of the left knee. No acute fracture or dislocation. No suspicious osseous lesion. Joint spaces are grossly preserved. Thin curvilinear calcific densities are seen within the medial and lateral compartments of the right and left knee. Peaking of the bilateral tibial spines. Small left patellar osteophytes. Atherosclerosis of the bilateral lower extremity vasculature.      Impression: No acute osseous abnormality. Bilateral knee osteoarthritis and chondrocalcinosis. Electronically signed by:  Catalino Michele MD  6/8/2022 2:03 PM CDT Workstation: [id number]     XR Knee Bilateral AP Standing     Result Date: 6/8/2022  Narrative: EXAM: XR KNEE 1-2 VIEWS, XR KNEES ANTEROPOSTERIOR STANDING BILATERAL COMPARISON: None INDICATION: pain, M25.562 Pain in left knee FINDINGS: Single weightbearing view of both knees. Lateral view of the left knee. No acute fracture or dislocation. No suspicious osseous lesion. Joint spaces are grossly preserved. Thin curvilinear calcific densities are seen within the medial and lateral compartments of the right and left knee. Peaking of the bilateral tibial spines. Small left patellar osteophytes. Atherosclerosis of the bilateral lower extremity vasculature.      Impression: No acute osseous abnormality.  Bilateral knee osteoarthritis and chondrocalcinosis. Electronically signed by:  Catalino Michele MD  6/8/2022 2:03 PM CDT Workstation: [id number]     CT Pelvis Without Contrast     Result Date: 5/31/2022  Narrative: Procedure: CT pelvis without contrast Reason for exam: Pelvic pain. Stress fracture suspected. Negative x-ray. FINDINGS: Axial computed tomography sequential imaging was performed from the iliac crests through the symphysis pubis without IV contrast administration. Sagittal and coronal reformation was performed. This exam was performed according to our departmental dose optimization program, which includes automated exposure control, adjustment of the mA and/or KV according to patient size and/or use of iterative reconstruction technique. Bony structures of the pelvis are normal. There is no evidence of fracture or dislocation identified. Soft tissue structures of the pelvis are unremarkable.      Impression: Negative CT pelvis with no acute abnormality identified. Electronically signed by:  Nicanor De Dios MD  5/31/2022 1:03 PM CDT Workstation: [id number]      ASSESSMENT:    Diagnoses and all orders for this visit:    Left hip pain  -     HYDROcodone-acetaminophen (NORCO) 7.5-325 MG per tablet; Take 1 tablet by mouth Every 6 (Six) Hours As Needed for Moderate Pain  or Severe Pain  for up to 10 days.    DDD (degenerative disc disease), lumbar  -     HYDROcodone-acetaminophen (NORCO) 7.5-325 MG per tablet; Take 1 tablet by mouth Every 6 (Six) Hours As Needed for Moderate Pain  or Severe Pain  for up to 10 days.  -     Ambulatory Referral to Neurosurgery    Chronic left-sided low back pain with left-sided sciatica  -     HYDROcodone-acetaminophen (NORCO) 7.5-325 MG per tablet; Take 1 tablet by mouth Every 6 (Six) Hours As Needed for Moderate Pain  or Severe Pain  for up to 10 days.  -     Ambulatory Referral to Neurosurgery    Radicular pain of left lower extremity  -     HYDROcodone-acetaminophen (NORCO)  7.5-325 MG per tablet; Take 1 tablet by mouth Every 6 (Six) Hours As Needed for Moderate Pain  or Severe Pain  for up to 10 days.  -     Ambulatory Referral to Neurosurgery    Numbness and tingling of left leg  -     HYDROcodone-acetaminophen (NORCO) 7.5-325 MG per tablet; Take 1 tablet by mouth Every 6 (Six) Hours As Needed for Moderate Pain  or Severe Pain  for up to 10 days.  -     Ambulatory Referral to Neurosurgery    Compression of lumbar nerve root  -     HYDROcodone-acetaminophen (NORCO) 7.5-325 MG per tablet; Take 1 tablet by mouth Every 6 (Six) Hours As Needed for Moderate Pain  or Severe Pain  for up to 10 days.  -     Ambulatory Referral to Neurosurgery    Foraminal stenosis of lumbar region  -     HYDROcodone-acetaminophen (NORCO) 7.5-325 MG per tablet; Take 1 tablet by mouth Every 6 (Six) Hours As Needed for Moderate Pain  or Severe Pain  for up to 10 days.  -     Ambulatory Referral to Neurosurgery    PLAN     MRI of lumbar spine reviewed and results are discussed with patient today.  We discussed that there is evidence of compression of the left L3 nerve root due to a disc fragment in the left lateral recess of L3.  We also discussed that she has other evidence of multilevel degenerative changes with foraminal narrowing at L4-L5 and possible compression of the right L4 nerve root and additional evidence of mild to moderate spinal stenosis at this level.  The patient also has evidence of foraminal narrowing at L5-S1 with possible compression of the right L5 nerve root.  Finally, she has evidence of foraminal narrowing on the left side at L5-S1 with possible compression of the left L5 nerve root.  The patient has not been experiencing any radicular symptoms affecting the right side so we discussed that the possible nerve root compressions on the right side are likely not an issue at this time.  We discussed that I suspect that the disc fragment that is compressing the left L3 nerve root is her source  "of severe pain that she has experienced the last several weeks.  The patient has experienced acute left-sided low back pain and posterior left hip pain that radiates down her left leg, primarily to the level of the left knee but also extends into the left foot at times as well as intermittent numbness and tingling symptoms throughout the left lower extremity.  The patient has experienced the symptoms for the past 2 months approximately and was initially evaluated at [facility] urgent care on 4/30/2022 after she had sustained a fall on that date when her left knee \"buckled\".  However, the patient states that her pain did not start on the date of the fall and she was already experiencing pain prior to that incident.  The patient does have a history of chronic low back pain that limits her activities.  Since last office visit, the patient reports some mild improvement in her pain that she continues to have left hip/leg pain that she describes as aching in nature at this time.  She continues to take Norco 7.5 mg for pain control and was also started on Neurontin 300 mg at bedtime at last office visit.  She continues to experience left leg weakness and states that she has to passively lift her left leg at times to cross her legs or get in and out of a vehicle.  I have highly encouraged the patient she utilize a cane or walker to mentally modify stress off of the lumbar spine but also to help to prevent falls.  I have recommended a referral to neurosurgery for further evaluation and we discussed that the disc fragment compressing her left L3 nerve root may need surgical intervention but it certainly warrants surgical consult by neurosurgeon.  The patient request to be referred to [place] so have placed a referral to Dr. Pittman today.  The patient is instructed to obtain a disc of her MRI images and take it with her to her appointment.  Patient verbalized understanding of these instructions.    MRI of the left hip " is also reviewed and results are discussed with patient today.  As previously noted, the patient presented to the ED for treatment on 5/30/2022 as her pain had become so severe in the left hip and left leg.  However, she was noted to be in atrial fibrillation at that time with rapid ventricular response so she was hospitalized.  During her hospitalization, Dr. Hurtado was consulted for her left hip pain and she underwent CT scan of her pelvis to rule out occult fracture, which was negative.  Her examination in office at her initial visit on 6/6/2022 was most consistent with lumbar radiculopathy but MRI of the left hip was ordered to rule out any other issues.  We discussed that she has evidence of signal change in the abductor and obturator muscles of the left hip suggestive of recent injury, which we discussed that she could have sustained muscle strains with her fall that occurred on 4/30/2022.  We also discussed that she has evidence of old avascular necrosis in the anterior left femoral head.  Additionally, she has some partial tearing of the right hamstring attachment on the ischial tuberosity of unknown chronicity but we discussed that she is not having any pain or symptoms on the right side and this is likely an incidental finding.  Additionally, she has extensive signal change in the right gluteus sal.  Also, the radiologist indicated that some of these findings may be due to injury but denervation and developing atrophy are also possibilities, which we discussed are more likely based on her MRI findings of the lumbar spine.     Recommend the following:    -Rest and activity modification for now with avoidance of straining, lifting, pulling, tugging, etc.  -Use of ice therapy and/or heat therapy to the low back to minimize pain/inflammation/muscle tension.  -Use of a cane or walker for modified weightbearing from the lumbar spine.  -Continue Neurontin 300 mg at bedtime for control of radicular  pain/symptoms affecting the left leg.  -Continue Norco 7.5 mg as needed for pain control.  This is refilled for the patient today at her request.  Patient is reminded to take the least amount of pain medication needed to control her pain.  Patient is cautioned that opioids can be addictive.  We discussed other potential adverse side effects of opioids including dizziness, drowsiness, increased risk for falls, constipation and/or respiratory depression.  Patient verbalized understanding of these risks.  Patient is encouraged to focus on nonopioid pain management methods as much as possible.  Recommend Tylenol as needed for milder pain.  The patient cannot take oral NSAIDs due to her history of chronic kidney disease-stage III.  LISBETH is reviewed internally via Epic and is noted to be appropriate.    Follow-up with orthopedics as needed for any new, worsening or persistent symptoms.  In regards to evidence of a disc fragment and nerve compression from the lumbar spine, would recommend that she follow-up with neurosurgery for further treatment recommendations and possible surgical intervention.  If the neurosurgeon recommends continued conservative care or the patient opts to decline surgical intervention, I will be happy to manage the patient with conservative treatment here locally if needed.    Time spent of a minimum of 30 minutes including the face to face evaluation, reviewing of medical history and prior medial records, reviewing of diagnostic studies, specialty referral, prescription drug management, documentation, patient education and coordination of care.     EMR Dragon/Coderwalliption Disclaimer: Some of this note may be an electronic transcription/translation of spoken language to printed text using the Dragon Dictation System.     Return if symptoms worsen or fail to improve.        This document has been electronically signed by INDERJIT Robles on June 21, 2022 12:49 CDT      INDERJIT Robles

## 2022-07-22 ENCOUNTER — OFFICE VISIT (OUTPATIENT)
Dept: CARDIOLOGY | Facility: CLINIC | Age: 77
End: 2022-07-22

## 2022-07-22 ENCOUNTER — LAB (OUTPATIENT)
Dept: LAB | Facility: HOSPITAL | Age: 77
End: 2022-07-22

## 2022-07-22 ENCOUNTER — TELEPHONE (OUTPATIENT)
Dept: CARDIOLOGY | Facility: CLINIC | Age: 77
End: 2022-07-22

## 2022-07-22 VITALS
HEIGHT: 66 IN | SYSTOLIC BLOOD PRESSURE: 130 MMHG | OXYGEN SATURATION: 98 % | DIASTOLIC BLOOD PRESSURE: 74 MMHG | BODY MASS INDEX: 26.2 KG/M2 | HEART RATE: 75 BPM | WEIGHT: 163 LBS

## 2022-07-22 DIAGNOSIS — I10 HYPERTENSION, UNSPECIFIED TYPE: ICD-10-CM

## 2022-07-22 DIAGNOSIS — I48.0 PAROXYSMAL ATRIAL FIBRILLATION: ICD-10-CM

## 2022-07-22 DIAGNOSIS — R09.89 BILATERAL CAROTID BRUITS: ICD-10-CM

## 2022-07-22 DIAGNOSIS — I48.0 PAROXYSMAL ATRIAL FIBRILLATION: Primary | ICD-10-CM

## 2022-07-22 LAB
DEPRECATED RDW RBC AUTO: 52.6 FL (ref 37–54)
ERYTHROCYTE [DISTWIDTH] IN BLOOD BY AUTOMATED COUNT: 18.6 % (ref 12.3–15.4)
HCT VFR BLD AUTO: 31.7 % (ref 34–46.6)
HGB BLD-MCNC: 9.5 G/DL (ref 12–15.9)
MCH RBC QN AUTO: 23.4 PG (ref 26.6–33)
MCHC RBC AUTO-ENTMCNC: 30 G/DL (ref 31.5–35.7)
MCV RBC AUTO: 78.1 FL (ref 79–97)
PLATELET # BLD AUTO: 295 10*3/MM3 (ref 140–450)
PMV BLD AUTO: 9.3 FL (ref 6–12)
QT INTERVAL: 372 MS
QTC INTERVAL: 415 MS
RBC # BLD AUTO: 4.06 10*6/MM3 (ref 3.77–5.28)
WBC NRBC COR # BLD: 6.61 10*3/MM3 (ref 3.4–10.8)

## 2022-07-22 PROCEDURE — 85027 COMPLETE CBC AUTOMATED: CPT

## 2022-07-22 PROCEDURE — 36415 COLL VENOUS BLD VENIPUNCTURE: CPT

## 2022-07-22 PROCEDURE — 99214 OFFICE O/P EST MOD 30 MIN: CPT | Performed by: INTERNAL MEDICINE

## 2022-07-22 PROCEDURE — 93000 ELECTROCARDIOGRAM COMPLETE: CPT | Performed by: INTERNAL MEDICINE

## 2022-07-22 NOTE — TELEPHONE ENCOUNTER
Called patient. Informed patient of test results. Patient voiced their understandings.       ----- Message from Karl Barnett MD sent at 7/22/2022  1:44 PM CDT -----  Her hemoglobin appears to be slightly lower than before. Please ask her to keep a close eye on any symptoms of bleeding and let us know at the earliest if she experiences any.

## 2022-07-22 NOTE — PROGRESS NOTES
Her hemoglobin appears to be slightly lower than before. Please ask her to keep a close eye on any symptoms of bleeding and let us know at the earliest if she experiences any.

## 2022-07-22 NOTE — PROGRESS NOTES
[facility] Cardiology  OFFICE NOTE    Cardiovascular Medicine  Karl Barnett M.D., [facility]         No referring provider defined for this encounter.    History of Present Illness    Shivani Galvez is a 77 y.o. female who presents for consultation today.  She has had diabetes mellitus, dyslipidemia, GERD, hypertension per prior documentation.  She reports having a mini stroke several years ago, she had visual difficulties (double vision) at this time which resolved.    She denies having any prior history of coronary stents, open heart surgeries, pacemaker/defibrillator placement, heart murmurs or heart valve problems before.    Around the end of May 2022, she was hospitalized for left hip pain.  During that admission, she was diagnosed with atrial fibrillation, this was a new diagnosis for her.  Cardiology consultation was not requested during that admission.  She was not started on anticoagulation because of history of bleeding in 2019 that required blood transfusion.  She reports not having any bleeding events since then.    The patient denies having any chest discomfort or dyspnea during her routine day-to-day activities.  She also denies having any palpitations, heart racing, irregular heartbeat or skipped heartbeat.  She has not had any dizziness, lightheadedness, presyncope or syncope.  She also denies having any PND or orthopnea.    7/22/2022:  She presented today for a follow-up visit.  She has started taking Eliquis.  She appears to have tolerated very well.  Denies having any hematemesis, hematochezia, melena or hematuria.  She denies having any palpitations, skipped heartbeats, irregular heartbeats, heart pounding, dizziness, presyncope or syncope.  She does not have any other concerning cardiovascular symptoms.  She reports significant lower back discomfort which radiates to bilateral lower extremities, more prominent on the right side.  She is going to consult with a  neurosurgeon on this in [place] in the near future.  Her  passed away recently from breathing difficulties.    Review of Systems - ROS  Constitution: Negative for weakness, weight gain and weight loss.   HENT: Negative for congestion.    Eyes: Negative for blurred vision.   Cardiovascular: As mentioned above  Respiratory: Negative for cough and hemoptysis.    Endocrine: Negative for polydipsia and polyuria.   Hematologic/Lymphatic: Negative for  active bleeding problem. Does not bruise/bleed easily.   Skin: Negative for flushing.   Musculoskeletal: Negative for neck pain and stiffness.  Positive for back pain and left/right lower extremity pain  Gastrointestinal: Negative for abdominal pain, diarrhea, jaundice, melena, nausea and vomiting.   Genitourinary: Negative for dysuria and hematuria.   Neurological: Negative for dizziness, focal weakness and numbness.   Psychiatric/Behavioral: Negative for altered mental status and depression.      All other systems were reviewed and were negative.    Past Medical History:   Diagnosis Date   • Anemia    • Diabetes mellitus (HCC)    • History of transfusion    • Hyperlipidemia    • Hypertension    • TIA (transient ischemic attack)        Family History:  family history is not on file.    Social History: She started smoking at the age of 43 years (2 PPD) and quit in 2015.    reports that she quit smoking about 7 years ago. She smoked 2.00 packs per day. She has never used smokeless tobacco. She reports that she does not drink alcohol and does not use drugs.    Allergies:  Allergies   Allergen Reactions   • Asa [Aspirin] Other (See Comments)     Bleeding          Current Outpatient Medications:   •  acetaminophen (TYLENOL) 650 MG 8 hr tablet, Take 650 mg by mouth 3 (Three) Times a Day As Needed., Disp: , Rfl:   •  alendronate (FOSAMAX) 70 MG tablet, Take 70 mg by mouth 1 (One) Time Per Week., Disp: , Rfl:   •  apixaban (ELIQUIS) 5 MG tablet tablet, Take 1 tablet by  "mouth 2 (Two) Times a Day for 90 days., Disp: 180 tablet, Rfl: 0  •  atorvastatin (LIPITOR) 40 MG tablet, Take 1 tablet by mouth Daily for 90 days., Disp: 90 tablet, Rfl: 0  •  calcium citrate-vitamin D (CITRACAL+D) 315-200 MG-UNIT per tablet, Take 1 tablet by mouth 2 (Two) Times a Day., Disp: , Rfl:   •  citalopram (CeleXA) 40 MG tablet, Take 1 tablet by mouth Daily., Disp: , Rfl:   •  diclofenac (VOLTAREN) 75 MG EC tablet, , Disp: , Rfl:   •  dilTIAZem CD (CARDIZEM CD) 180 MG 24 hr capsule, Take 1 capsule by mouth Daily for 90 days., Disp: 90 capsule, Rfl: 2  •  empagliflozin (JARDIANCE) 25 MG tablet tablet, Take 25 mg by mouth Daily., Disp: , Rfl:   •  ferrous gluconate (FERGON) 324 MG tablet, , Disp: , Rfl:   •  gabapentin (Neurontin) 300 MG capsule, Take 1 capsule by mouth At Night As Needed (radicular pain left leg)., Disp: 30 capsule, Rfl: 1  •  Lancet Devices (ACCU-CHEK SOFTCLIX) lancets, 1 Units., Disp: , Rfl:   •  latanoprost (XALATAN) 0.005 % ophthalmic solution, , Disp: , Rfl:   •  metFORMIN (GLUCOPHAGE) 500 MG tablet, Take 1,000 mg by mouth 2 (Two) Times a Day., Disp: , Rfl:   •  omeprazole (priLOSEC) 20 MG capsule, Take 20 mg by mouth Daily., Disp: , Rfl:   •  vitamin D3 125 MCG (5000 UT) capsule capsule, Take 1 capsule by mouth Daily., Disp: , Rfl:     Physical Exam:  Vitals:    07/22/22 0957   BP: 130/74   BP Location: Left arm   Patient Position: Sitting   Cuff Size: Adult   Pulse: 75   SpO2: 98%   Weight: 73.9 kg (163 lb)   Height: 167.6 cm (66\")   PainSc: 0-No pain     Current Pain Level: none  Pulse Ox: Normal  on room air  General: alert, appears stated age and cooperative     Body Habitus: Borderline overweight  HEENT: Head: Normocephalic, no lesions, without obvious abnormality.     Neuro: alert, oriented x3  JVP: Volume/Pulsation: Normal.  Normal waveforms.   Appropriate inspiratory decrease.     Carotid Exam: Possible radiation of cardiac murmur to the carotids; bilateral carotid bruits " cannot be ruled out   Carotid Volume: normal.     Respirations: no increased work of breathing   Chest:  Normal    Pulmonary:Normal   Heart rate: normal    Heart Rhythm: Irregularly irregular     Heart Sounds: S1: normal  S2: normal  Systolic murmur heard at bilateral upper sternal borders with possible radiation to carotids  Abdomen:   Appearance: normal .  Palpation: Soft, non-tender to palpation, bowel sounds positive in all four quadrants  Extremity: no edema.       DATA REVIEWED:     EKG. I personally reviewed and interpreted the EKG.  atrial fibrillation w/ controlled ventricular response, septal infarct, nonspecific T wave normality on 6/17/2022    ECG/EMG Results (all)     Procedure Component Value Units Date/Time    ECG 12 Lead [[id number]] Collected: 06/17/22 0943     Updated: 06/17/22 0946     QT Interval 354 ms      QTC Interval 403 ms     Narrative:      Test Reason : a fib  Blood Pressure :   */*   mmHG  Vent. Rate :  78 BPM     Atrial Rate :  75 BPM     P-R Int :   * ms          QRS Dur :  88 ms      QT Int : 354 ms       P-R-T Axes :   *  71 100 degrees     QTc Int : 403 ms    Atrial fibrillation  T wave abnormality, consider lateral ischemia or digitalis effect  Abnormal ECG  When compared with ECG of 30-MAY-2022 10:09,  Vent. rate has decreased BY  64 BPM  Nonspecific T wave abnormality now evident in Inferior leads  T wave inversion now evident in Lateral leads    Referred By: MABEL           Confirmed By:         ---------------------------------------------------  TTE/RUBEN:  Results for orders placed during the hospital encounter of 05/30/22    Adult Transthoracic Echo Complete W/ Cont if Necessary Per Protocol    Interpretation Summary  · Left ventricular wall thickness is consistent with mild to moderate concentric hypertrophy.  · Left ventricular ejection fraction appears to be 51 - 55%.  · Left ventricular diastolic function was normal.  · There is moderate calcification of the aortic valve  mainly affecting the non-coronary and right coronary cusp(s).      -----------------------------------------------------  CXR/Imaging:   Imaging Results (Most Recent)     None          -----------------------------------------------------  CT:   No radiology results for the last 30 days.    ----------------------------------------------------      --------------------------------------------------------------------------------------------------  LABS:     The CVD Risk score (Tessy et al., 2008) failed to calculate for the following reasons:    The 2008 CVD risk score is only valid for ages 30 to 74         Lab Results   Component Value Date    GLUCOSE 192 (H) 06/01/2022    BUN 17 06/01/2022    CREATININE 1.05 (H) 06/01/2022    EGFRIFNONA 51 01/03/2019    BCR 16.2 06/01/2022    K 4.6 06/01/2022    CO2 20.0 (L) 06/01/2022    CALCIUM 9.8 06/01/2022    ALBUMIN 4.50 05/30/2022    LABIL2 1.7 01/06/2019    AST 18 05/30/2022    ALT 16 05/30/2022     Lab Results   Component Value Date    WBC 10.49 06/01/2022    HGB 10.6 (L) 06/01/2022    HCT 35.7 06/01/2022    MCV 76.6 (L) 06/01/2022     06/01/2022     Lab Results   Component Value Date    CHOL 135 03/25/2021    CHLPL 104 05/02/2022    TRIG 178 (H) 05/02/2022    HDL 41 05/02/2022    LDL 27 05/02/2022     Lab Results   Component Value Date    TSH 1.36 05/02/2022     Lab Results   Component Value Date    CKTOTAL 64 05/30/2022    TROPONINT <0.010 05/30/2022     Lab Results   Component Value Date    HGBA1C 9.5 (H) 05/02/2022     No results found for: DDIMER  Lab Results   Component Value Date    ALT 16 05/30/2022     Lab Results   Component Value Date    HGBA1C 9.5 (H) 05/02/2022    HGBA1C 7.8 (H) 03/25/2021    HGBA1C 7.9 (H) 09/25/2020     Lab Results   Component Value Date    CREATININE 1.05 (H) 06/01/2022     Lab Results   Component Value Date    IRON 16 (L) 01/03/2019    TIBC 460 01/03/2019    FERRITIN 9 01/03/2019     Lab Results   Component Value Date    INR  "0.99 05/30/2022    INR 1.02 01/03/2019    PROTIME 12.9 05/30/2022    PROTIME 13.2 01/03/2019       [unfilled]    1. Atrial fibrillation, paroxysmal type (HCC)  She was diagnosed with atrial fibrillation during a hospitalization for left hip discomfort in May 2022.  At that time, she was not started on anticoagulation due to history of GI bleed in 2019.  She was started on diltiazem for rate control.  She was in atrial fibrillation with controlled ventricular response on ECG at her initial visit with us in June 2022.  ECG done in the clinic today showed normal sinus rhythm.  Her [id number] score is at least 5 (age, sex, hypertension, diabetes mellitus).  She also reports having a \"mini-stroke\" several years ago that caused her to have double vision which resolved.  Serum TSH was normal in May 2022.  She had a transthoracic echocardiogram in May 2022, LV systolic function was normal, left atrium was mildly dilated, RV/RA sizes were normal, aortic valve calcification with possible mild aortic stenosis, trace to mild MR were noted.  The risks (bleeding including life-threatening bleeding), benefits (reduction in stroke risk) and alternatives (baby aspirin therapy, consideration of JOANN occlusion device) of anticoagulation therapy in the setting of atrial fibrillation with the patient at her first visit (June 2022) and she preferred initiation of anticoagulation based on our discussion at that time.  She has tolerated Eliquis therapy well.  Denies any concerning bleeding symptoms.  Will obtain CBC today to screen for any developing anemia.  Continue Eliquis 5 mg orally twice daily.  Continue diltiazem for rate control.  - ECG 12 Lead    2. Hypertension, unspecified type  Clinic BP was within normal limits today.  She is on diltiazem.  No changes were made to her antihypertensive medication regimen today.    3.  Bilateral carotid bruits  Possible bilateral carotid bruits (?radiated cardiac murmur) were heard on " auscultation.  Will obtain bilateral carotid duplex.      Prevention:  BMI is >= 25 and <30. (Overweight) The following options were offered after discussion;: referral to primary care      Shivani Overton Lenny  reports that she quit smoking about 7 years ago. She smoked 2.00 packs per day. She has never used smokeless tobacco.. I have educated her on continued tobacco cessation.      Return in about 6 months (around 1/22/2023).            Electronically signed by Karl Barnett MD on 07/22/22 at 09:48 CDT

## 2022-08-15 DIAGNOSIS — I65.23 BILATERAL CAROTID ARTERY STENOSIS: Primary | ICD-10-CM

## 2022-08-16 DIAGNOSIS — I65.23 CAROTID STENOSIS, ASYMPTOMATIC, BILATERAL: Primary | ICD-10-CM

## 2022-08-25 ENCOUNTER — APPOINTMENT (OUTPATIENT)
Dept: CT IMAGING | Facility: HOSPITAL | Age: 77
End: 2022-08-25

## 2022-08-31 ENCOUNTER — OFFICE VISIT (OUTPATIENT)
Dept: CARDIOLOGY | Facility: CLINIC | Age: 77
End: 2022-08-31

## 2022-08-31 VITALS
WEIGHT: 155 LBS | HEART RATE: 72 BPM | HEIGHT: 66 IN | OXYGEN SATURATION: 96 % | BODY MASS INDEX: 24.91 KG/M2 | SYSTOLIC BLOOD PRESSURE: 126 MMHG | DIASTOLIC BLOOD PRESSURE: 80 MMHG

## 2022-08-31 DIAGNOSIS — I65.23 BILATERAL CAROTID ARTERY STENOSIS: ICD-10-CM

## 2022-08-31 DIAGNOSIS — I10 HYPERTENSION, UNSPECIFIED TYPE: ICD-10-CM

## 2022-08-31 DIAGNOSIS — I48.0 PAROXYSMAL ATRIAL FIBRILLATION: Primary | ICD-10-CM

## 2022-08-31 PROCEDURE — 99214 OFFICE O/P EST MOD 30 MIN: CPT | Performed by: INTERNAL MEDICINE

## 2022-08-31 PROCEDURE — 93000 ELECTROCARDIOGRAM COMPLETE: CPT | Performed by: INTERNAL MEDICINE

## 2022-08-31 RX ORDER — ATORVASTATIN CALCIUM 40 MG/1
40 TABLET, FILM COATED ORAL DAILY
Qty: 90 TABLET | Refills: 3 | Status: SHIPPED | OUTPATIENT
Start: 2022-08-31 | End: 2022-10-07

## 2022-08-31 NOTE — PROGRESS NOTES
[facility] Cardiology  OFFICE NOTE    Cardiovascular Medicine  Karl Barnett M.D., [facility]         No referring provider defined for this encounter.      Shivani Galvez is a 77 y.o. female who presents for consultation today.  She has had diabetes mellitus, dyslipidemia, GERD, hypertension per prior documentation.  She reports having a mini stroke several years ago, she had visual difficulties (double vision) at this time which resolved.    She denies having any prior history of coronary stents, open heart surgeries, pacemaker/defibrillator placement, heart murmurs or heart valve problems before.    Around the end of May 2022, she was hospitalized for left hip pain.  During that admission, she was diagnosed with atrial fibrillation, this was a new diagnosis for her.  Cardiology consultation was not requested during that admission.  She was not started on anticoagulation because of history of bleeding in 2019 that required blood transfusion.  She reports not having any bleeding events since then.    The patient denies having any chest discomfort or dyspnea during her routine day-to-day activities.  She also denies having any palpitations, heart racing, irregular heartbeat or skipped heartbeat.  She has not had any dizziness, lightheadedness, presyncope or syncope.  She also denies having any PND or orthopnea.    7/22/2022:  She presented today for a follow-up visit.  She has started taking Eliquis.  She appears to have tolerated very well.  Denies having any hematemesis, hematochezia, melena or hematuria.  She denies having any palpitations, skipped heartbeats, irregular heartbeats, heart pounding, dizziness, presyncope or syncope.  She does not have any other concerning cardiovascular symptoms.  She reports significant lower back discomfort which radiates to bilateral lower extremities, more prominent on the right side.  She is going to consult with a neurosurgeon on this in [place]  in the near future.  Her  passed away recently from breathing difficulties.    8/31/2022:  She presented today for a follow-up visit.  She has been taking Eliquis.  She denies having any bleeding symptoms.  She has not had any palpitations/irregular heartbeat/skipped heartbeats/heart pounding.  She denies having any cardiovascular concerns today.  Her back surgery has been postponed/canceled because her A1c levels were running high.  We discussed the findings on carotid ultrasound.  She is scheduled to see Dr. Keenan next month.    Review of Systems - ROS  Constitution: Negative for weakness, weight gain and weight loss.   HENT: Negative for congestion.    Eyes: Negative for blurred vision.   Cardiovascular: As mentioned above  Respiratory: Negative for cough and hemoptysis.    Endocrine: Negative for polydipsia and polyuria.   Hematologic/Lymphatic: Negative for  active bleeding problem. Does not bruise/bleed easily.   Skin: Negative for flushing.   Musculoskeletal: Negative for neck pain and stiffness.  Positive for back pain and left/right lower extremity pain  Gastrointestinal: Negative for abdominal pain, nausea and vomiting.   Genitourinary: Negative for dysuria and hematuria.   Neurological: Negative for dizziness, focal weakness and numbness.   Psychiatric/Behavioral: Negative for altered mental status and depression.      All other systems were reviewed and were negative.    Past Medical History:   Diagnosis Date   • Anemia    • Diabetes mellitus (HCC)    • History of transfusion    • Hyperlipidemia    • Hypertension    • TIA (transient ischemic attack)        Family History:  family history is not on file.    Social History: She started smoking at the age of 43 years (2 PPD) and quit in 2015.    reports that she quit smoking about 7 years ago. She smoked 2.00 packs per day. She has never used smokeless tobacco. She reports that she does not drink alcohol and does not use  "drugs.    Allergies:  Allergies   Allergen Reactions   • Asa [Aspirin] Other (See Comments)     Bleeding          Current Outpatient Medications:   •  acetaminophen (TYLENOL) 650 MG 8 hr tablet, Take 650 mg by mouth 3 (Three) Times a Day As Needed., Disp: , Rfl:   •  alendronate (FOSAMAX) 70 MG tablet, Take 70 mg by mouth 1 (One) Time Per Week., Disp: , Rfl:   •  apixaban (ELIQUIS) 5 MG tablet tablet, Take 1 tablet by mouth 2 (Two) Times a Day for 90 days., Disp: 180 tablet, Rfl: 0  •  atorvastatin (LIPITOR) 40 MG tablet, Take 1 tablet by mouth Daily for 90 days., Disp: 90 tablet, Rfl: 3  •  calcium citrate-vitamin D (CITRACAL+D) 315-200 MG-UNIT per tablet, Take 1 tablet by mouth 2 (Two) Times a Day., Disp: , Rfl:   •  citalopram (CeleXA) 40 MG tablet, Take 1 tablet by mouth Daily., Disp: , Rfl:   •  dilTIAZem CD (CARDIZEM CD) 180 MG 24 hr capsule, Take 1 capsule by mouth Daily for 90 days., Disp: 90 capsule, Rfl: 2  •  empagliflozin (JARDIANCE) 25 MG tablet tablet, Take 25 mg by mouth Daily., Disp: , Rfl:   •  ferrous gluconate (FERGON) 324 MG tablet, , Disp: , Rfl:   •  Lancet Devices (ACCU-CHEK SOFTCLIX) lancets, 1 Units., Disp: , Rfl:   •  latanoprost (XALATAN) 0.005 % ophthalmic solution, , Disp: , Rfl:   •  metFORMIN (GLUCOPHAGE) 500 MG tablet, Take 1,000 mg by mouth 2 (Two) Times a Day., Disp: , Rfl:   •  omeprazole (priLOSEC) 20 MG capsule, Take 20 mg by mouth Daily., Disp: , Rfl:   •  vitamin D3 125 MCG (5000 UT) capsule capsule, Take 1 capsule by mouth Daily., Disp: , Rfl:     Physical Exam:  Vitals:    08/31/22 1527   BP: 126/80   BP Location: Left arm   Patient Position: Sitting   Cuff Size: Adult   Pulse: 72   SpO2: 96%   Weight: 70.3 kg (155 lb)   Height: 167.6 cm (66\")   PainSc: 0-No pain     Current Pain Level: none  Pulse Ox: Normal  on room air  General: alert, appears stated age and cooperative     Body Habitus: Borderline overweight  HEENT: Head: Normocephalic, no lesions, without obvious " abnormality.     Neuro: alert, oriented x3  JVP: Volume/Pulsation: Normal.  Normal waveforms.   Appropriate inspiratory decrease.     Carotid Exam: Bilateral carotid bruits  Carotid Volume: normal.     Respirations: no increased work of breathing   Chest:  Normal    Pulmonary:Normal   Heart rate: normal    Heart Rhythm: Irregularly irregular     Heart Sounds: S1: normal  S2: normal  Systolic murmur heard at bilateral upper sternal borders with possible radiation to carotids  Abdomen:   Appearance: normal .  Palpation: Soft, non-tender to palpation, bowel sounds positive in all four quadrants  Extremity: no edema.       DATA REVIEWED:     EKG. I personally reviewed and interpreted the EKG.  atrial fibrillation w/ controlled ventricular response, septal infarct, nonspecific T wave normality on 6/17/2022    ECG/EMG Results (all)     Procedure Component Value Units Date/Time    ECG 12 Lead [[id number]] Collected: 06/17/22 0943     Updated: 06/17/22 0946     QT Interval 354 ms      QTC Interval 403 ms     Narrative:      Test Reason : a fib  Blood Pressure :   */*   mmHG  Vent. Rate :  78 BPM     Atrial Rate :  75 BPM     P-R Int :   * ms          QRS Dur :  88 ms      QT Int : 354 ms       P-R-T Axes :   *  71 100 degrees     QTc Int : 403 ms    Atrial fibrillation  T wave abnormality, consider lateral ischemia or digitalis effect  Abnormal ECG  When compared with ECG of 30-MAY-2022 10:09,  Vent. rate has decreased BY  64 BPM  Nonspecific T wave abnormality now evident in Inferior leads  T wave inversion now evident in Lateral leads    Referred By: MABEL           Confirmed By:         ---------------------------------------------------  TTE/RUBEN:  Results for orders placed during the hospital encounter of 05/30/22    Adult Transthoracic Echo Complete W/ Cont if Necessary Per Protocol    Interpretation Summary  · Left ventricular wall thickness is consistent with mild to moderate concentric hypertrophy.  · Left  ventricular ejection fraction appears to be 51 - 55%.  · Left ventricular diastolic function was normal.  · There is moderate calcification of the aortic valve mainly affecting the non-coronary and right coronary cusp(s).      -----------------------------------------------------  CXR/Imaging:   Imaging Results (Most Recent)     None          -----------------------------------------------------  CT:   No radiology results for the last 30 days.    ----------------------------------------------------      --------------------------------------------------------------------------------------------------  LABS:     The CVD Risk score (Tessy et al., 2008) failed to calculate for the following reasons:    The 2008 CVD risk score is only valid for ages 30 to 74         Lab Results   Component Value Date    GLUCOSE 192 (H) 06/01/2022    BUN 15 08/08/2022    CREATININE 1.0 08/08/2022    EGFRIFNONA 51 01/03/2019    BCR 16.2 06/01/2022    K 4.5 08/08/2022    CO2 25 08/08/2022    CALCIUM 9.7 08/08/2022    ALBUMIN 4.50 05/30/2022    LABIL2 1.7 01/06/2019    AST 18 05/30/2022    ALT 16 05/30/2022     Lab Results   Component Value Date    WBC 6.61 07/22/2022    HGB 9.5 (L) 07/22/2022    HCT 31.7 (L) 07/22/2022    MCV 78.1 (L) 07/22/2022     07/22/2022     Lab Results   Component Value Date    CHOL 135 03/25/2021    CHLPL 104 05/02/2022    TRIG 178 (H) 05/02/2022    HDL 41 05/02/2022    LDL 27 05/02/2022     Lab Results   Component Value Date    TSH 1.36 05/02/2022     Lab Results   Component Value Date    CKTOTAL 64 05/30/2022    TROPONINT <0.010 05/30/2022     Lab Results   Component Value Date    HGBA1C 9.0 (H) 08/08/2022     No results found for: DDIMER  Lab Results   Component Value Date    ALT 16 05/30/2022     Lab Results   Component Value Date    HGBA1C 9.0 (H) 08/08/2022    HGBA1C 9.5 (H) 05/02/2022    HGBA1C 7.8 (H) 03/25/2021     Lab Results   Component Value Date    CREATININE 1.0 08/08/2022     Lab  "Results   Component Value Date    IRON 16 (L) 01/03/2019    TIBC 460 01/03/2019    FERRITIN 9 01/03/2019     Lab Results   Component Value Date    INR 1.07 08/08/2022    INR 0.99 05/30/2022    INR 1.02 01/03/2019    PROTIME 10.9 08/08/2022    PROTIME 12.9 05/30/2022    PROTIME 13.2 01/03/2019       [unfilled]    1. Atrial fibrillation, paroxysmal type (HCC)  She was diagnosed with atrial fibrillation during a hospitalization for left hip discomfort in May 2022.  At that time, she was not started on anticoagulation due to history of GI bleed in 2019.  She was started on diltiazem for rate control.  Her [id number] score is at least 6 (age, sex, hypertension, diabetes mellitus, carotid artery stenosis).  She also reports having a \"mini-stroke\" several years ago that caused her to have double vision which resolved.  Serum TSH was normal in May 2022.  She had a transthoracic echocardiogram in May 2022, LV systolic function was normal, left atrium was mildly dilated, RV/RA sizes were normal, aortic valve calcification with possible mild aortic stenosis, trace to mild MR were noted.  She has tolerated Eliquis therapy well.    She appears to be largely asymptomatic from atrial fibrillation.  Continue Eliquis and diltiazem at current doses.  - ECG 12 Lead    2. Hypertension, unspecified type  Clinic BP was within normal limits today.  She is on diltiazem.  No changes were made to her antihypertensive medication regimen today.    3.  Bilateral carotid artery stenosis  She has been referred to Dr. Keenan and has an appointment with him on 9/12/2022.  Refill for atorvastatin was sent to her pharmacy today.    Prevention:  BMI is >= 25 and <30. (Overweight) The following options were offered after discussion;: referral to primary care      Shivani Galvez  reports that she quit smoking about 7 years ago. She smoked 2.00 packs per day. She has never used smokeless tobacco.. I have educated her on continued tobacco " cessation.      Return in about 6 months (around 2/28/2023).            Electronically signed by Karl Barnett MD on 08/31/22 at 09:48 CDT

## 2022-09-03 LAB
QT INTERVAL: 310 MS
QTC INTERVAL: 404 MS

## 2022-09-08 ENCOUNTER — HOSPITAL ENCOUNTER (OUTPATIENT)
Dept: CT IMAGING | Facility: HOSPITAL | Age: 77
Discharge: HOME | End: 2022-09-08
Admitting: THORACIC SURGERY (CARDIOTHORACIC VASCULAR SURGERY)

## 2022-09-08 DIAGNOSIS — I65.23 CAROTID STENOSIS, ASYMPTOMATIC, BILATERAL: ICD-10-CM

## 2022-09-08 PROCEDURE — 70498 CT ANGIOGRAPHY NECK: CPT

## 2022-09-08 PROCEDURE — 0 IOPAMIDOL PER 1 ML: Performed by: THORACIC SURGERY (CARDIOTHORACIC VASCULAR SURGERY)

## 2022-09-08 RX ADMIN — IOPAMIDOL 90 ML: 755 INJECTION, SOLUTION INTRAVENOUS at 13:16

## 2022-09-12 ENCOUNTER — OFFICE VISIT (OUTPATIENT)
Dept: CARDIAC SURGERY | Facility: CLINIC | Age: 77
End: 2022-09-12

## 2022-09-12 VITALS
WEIGHT: 157.2 LBS | DIASTOLIC BLOOD PRESSURE: 68 MMHG | TEMPERATURE: 97.8 F | HEIGHT: 66 IN | HEART RATE: 86 BPM | SYSTOLIC BLOOD PRESSURE: 108 MMHG | OXYGEN SATURATION: 98 % | BODY MASS INDEX: 25.26 KG/M2

## 2022-09-12 DIAGNOSIS — E78.2 MIXED HYPERLIPIDEMIA: ICD-10-CM

## 2022-09-12 DIAGNOSIS — E66.3 OVERWEIGHT WITH BODY MASS INDEX (BMI) OF 25 TO 25.9 IN ADULT: ICD-10-CM

## 2022-09-12 DIAGNOSIS — I48.91 ATRIAL FIBRILLATION WITH RVR: Primary | ICD-10-CM

## 2022-09-12 DIAGNOSIS — E11.42 CONTROLLED TYPE 2 DIABETES MELLITUS WITH DIABETIC POLYNEUROPATHY, WITHOUT LONG-TERM CURRENT USE OF INSULIN: ICD-10-CM

## 2022-09-12 DIAGNOSIS — M54.42 CHRONIC LEFT-SIDED LOW BACK PAIN WITH LEFT-SIDED SCIATICA: ICD-10-CM

## 2022-09-12 DIAGNOSIS — I65.23 CAROTID STENOSIS, ASYMPTOMATIC, BILATERAL: ICD-10-CM

## 2022-09-12 DIAGNOSIS — G89.29 CHRONIC LEFT-SIDED LOW BACK PAIN WITH LEFT-SIDED SCIATICA: ICD-10-CM

## 2022-09-12 PROCEDURE — 99205 OFFICE O/P NEW HI 60 MIN: CPT | Performed by: THORACIC SURGERY (CARDIOTHORACIC VASCULAR SURGERY)

## 2022-09-20 ENCOUNTER — TELEPHONE (OUTPATIENT)
Dept: CARDIAC SURGERY | Facility: CLINIC | Age: 77
End: 2022-09-20

## 2022-09-20 DIAGNOSIS — I65.23 CAROTID STENOSIS, ASYMPTOMATIC, BILATERAL: Primary | ICD-10-CM

## 2022-09-20 RX ORDER — SODIUM CHLORIDE 9 MG/ML
100 INJECTION, SOLUTION INTRAVENOUS CONTINUOUS
Status: CANCELLED | OUTPATIENT
Start: 2022-10-11

## 2022-09-20 RX ORDER — BUPIVACAINE HCL/0.9 % NACL/PF 0.1 %
2 PLASTIC BAG, INJECTION (ML) EPIDURAL ONCE
Status: CANCELLED | OUTPATIENT
Start: 2022-10-11 | End: 2022-09-20

## 2022-09-20 RX ORDER — CLOPIDOGREL BISULFATE 75 MG/1
75 TABLET ORAL DAILY
Qty: 30 TABLET | Refills: 0 | Status: SHIPPED | OUTPATIENT
Start: 2022-09-20 | End: 2022-10-20

## 2022-09-20 NOTE — TELEPHONE ENCOUNTER
Spoke to pt and gave her sx date, pre-op date and time, along with instructions to continue ASA, plavix and statin. Pt verbalized understanding

## 2022-09-27 PROBLEM — E11.42 CONTROLLED TYPE 2 DIABETES MELLITUS WITH DIABETIC POLYNEUROPATHY, WITHOUT LONG-TERM CURRENT USE OF INSULIN: Status: ACTIVE | Noted: 2022-09-27

## 2022-09-27 PROBLEM — E78.2 MIXED HYPERLIPIDEMIA: Status: ACTIVE | Noted: 2022-09-27

## 2022-09-27 PROBLEM — E66.3 OVERWEIGHT WITH BODY MASS INDEX (BMI) OF 25 TO 25.9 IN ADULT: Status: ACTIVE | Noted: 2022-09-27

## 2022-09-27 NOTE — PROGRESS NOTES
9/12/2022    Shivani Galvez  1945    Chief Complaint:    Chief Complaint   Patient presents with   • Carotid Artery Disease       HPI:      PCP:  Silvana Rangel APRN  Cardiology:  Dr Barnett     77 y.o. female with Hyperlipidemia(stable, increased risk cardiovascular events), Diabetes Mellitus(stable, increased risk cardiovascular events), Obesity(uncontrolled, increased risk cardiovascular events) and Atrial fibrillation(stable, increased risk stroke) , carotid stenosis(new, chronic severe progression, increase risk stroke).  former smoker.  Asymptomatic carotid stenosis.  Afib.  remote TIA 2006. Back trouble, ruptured disk, no operation planned .  No stroke amaurosis.  No MI claudication. No other associated signs, symptoms or modifying factors.    8/2022 Carotid Duplex:  CANDACE >70% (407/149cm/s, ratio 7.4), LICA 50-69% (169/59cm/s, ratio 1.2) antegrade verts.  8/2022 CTA Carotids:  CANDACE subtotal occlusion/string sign, RCCA 30%, RSCA 50%, vert widely patent.  LICA 80% prox, LSCA 30%, vert 60% prox.    6/2022 Echocardiogram:  EF 50%, LA 41mm, LV 37mm.  Mild MR, trace TR.  9/2022 ECG:  Afib 102, QTc 404, septal infarct.    The following portions of the patient's history were reviewed and updated as appropriate: allergies, current medications, past family history, past medical history, past social history, past surgical history and problem list.  Recent images independently reviewed.  Available laboratory values reviewed.    PMH:  Past Medical History:   Diagnosis Date   • Anemia    • Carotid stenosis, asymptomatic, bilateral 9/20/2022   • Diabetes mellitus (HCC)    • History of transfusion    • Hyperlipidemia    • Hypertension    • TIA (transient ischemic attack)      Past Surgical History:   Procedure Laterality Date   • COLONOSCOPY N/A 1/17/2019    Procedure: COLONOSCOPY;  Surgeon: Lucio Ni DO;  Location: [facility] ENDOSCOPY;  Service: Gastroenterology   • TUBAL ABDOMINAL LIGATION       No family  history on file.  Social History     Tobacco Use   • Smoking status: Former Smoker     Packs/day: 2.00     Quit date: 2015     Years since quittin.7   • Smokeless tobacco: Never Used   Substance Use Topics   • Alcohol use: No   • Drug use: No       ALLERGIES:  Allergies   Allergen Reactions   • Asa [Aspirin] Other (See Comments)     Bleeding          MEDICATIONS:    Current Outpatient Medications:   •  acetaminophen (TYLENOL) 650 MG 8 hr tablet, Take 650 mg by mouth 3 (Three) Times a Day As Needed., Disp: , Rfl:   •  alendronate (FOSAMAX) 70 MG tablet, Take 70 mg by mouth 1 (One) Time Per Week., Disp: , Rfl:   •  atorvastatin (LIPITOR) 40 MG tablet, Take 1 tablet by mouth Daily for 90 days., Disp: 90 tablet, Rfl: 3  •  calcium citrate-vitamin D (CITRACAL+D) 315-200 MG-UNIT per tablet, Take 1 tablet by mouth 2 (Two) Times a Day., Disp: , Rfl:   •  citalopram (CeleXA) 40 MG tablet, Take 1 tablet by mouth Daily., Disp: , Rfl:   •  dilTIAZem CD (CARDIZEM CD) 180 MG 24 hr capsule, Take 1 capsule by mouth Daily for 90 days., Disp: 90 capsule, Rfl: 2  •  empagliflozin (JARDIANCE) 25 MG tablet tablet, Take 25 mg by mouth Daily., Disp: , Rfl:   •  ferrous gluconate (FERGON) 324 MG tablet, , Disp: , Rfl:   •  Lancet Devices (ACCU-CHEK SOFTCLIX) lancets, 1 Units., Disp: , Rfl:   •  latanoprost (XALATAN) 0.005 % ophthalmic solution, , Disp: , Rfl:   •  metFORMIN (GLUCOPHAGE) 500 MG tablet, Take 1,000 mg by mouth 2 (Two) Times a Day., Disp: , Rfl:   •  omeprazole (priLOSEC) 20 MG capsule, Take 20 mg by mouth Daily., Disp: , Rfl:   •  vitamin D3 125 MCG (5000 UT) capsule capsule, Take 1 capsule by mouth Daily., Disp: , Rfl:   •  clopidogrel (Plavix) 75 MG tablet, Take 1 tablet by mouth Daily for 30 days. Indications: Carotid Artery Stenting, Disp: 30 tablet, Rfl: 0    Review of Systems   Review of Systems   Constitutional: Positive for malaise/fatigue. Negative for weight loss.   Cardiovascular: Positive for irregular  "heartbeat and palpitations. Negative for chest pain, claudication and dyspnea on exertion.   Respiratory: Negative for cough and shortness of breath.    Skin: Negative for color change and poor wound healing.   Musculoskeletal: Positive for arthritis, back pain and myalgias.   Neurological: Positive for numbness and paresthesias. Negative for dizziness and weakness.       Physical Exam   Vitals:    09/12/22 1442 09/12/22 1444   BP: 104/62 108/68   BP Location: Left arm Right arm   Pulse: 86    Temp: 97.8 °F (36.6 °C)    TempSrc: Infrared    SpO2: 98%    Weight: 71.3 kg (157 lb 3.2 oz)    Height: 167.6 cm (66\")      Body surface area is 1.81 meters squared.  Body mass index is 25.37 kg/m².  Physical Exam  Constitutional:       General: She is not in acute distress.     Appearance: She is not ill-appearing.   HENT:      Right Ear: Hearing normal.      Left Ear: Hearing normal.      Nose: No nasal deformity.      Mouth/Throat:      Dentition: Normal dentition. Does not have dentures.   Cardiovascular:      Rate and Rhythm: Normal rate. Rhythm irregularly irregular.      Pulses:           Carotid pulses are 2+ on the right side with bruit and 2+ on the left side with bruit.       Radial pulses are 2+ on the right side and 2+ on the left side.        Dorsalis pedis pulses are 2+ on the right side and 2+ on the left side.        Posterior tibial pulses are 1+ on the right side and 1+ on the left side.      Heart sounds: No murmur heard.  Pulmonary:      Effort: Pulmonary effort is normal.      Breath sounds: Normal breath sounds.   Abdominal:      General: There is no distension.      Palpations: Abdomen is soft. There is no mass.      Tenderness: There is no abdominal tenderness.   Musculoskeletal:         General: No deformity.      Comments: Gait normal.    Skin:     General: Skin is warm and dry.      Coloration: Skin is not pale.      Findings: No erythema.      Comments: No venous staining   Neurological:      " Mental Status: She is alert and oriented to person, place, and time.   Psychiatric:         Speech: Speech normal.         Behavior: Behavior is cooperative.         Thought Content: Thought content normal.         Judgment: Judgment normal.         BUN   Date Value Ref Range Status   08/08/2022 15 7 - 25 mg/dL Final     Creatinine   Date Value Ref Range Status   08/08/2022 1.0 0.7 - 1.3 mg/dL Final     eGFR Non  Amer   Date Value Ref Range Status   01/03/2019 51 39 - 90 mL/min/1.73 Final       ASSESSMENT:  Diagnoses and all orders for this visit:    1. Atrial fibrillation with RVR (HCC) (Primary)    2. Carotid stenosis, asymptomatic, bilateral    3. Chronic left-sided low back pain with left-sided sciatica    4. Mixed hyperlipidemia    5. Controlled type 2 diabetes mellitus with diabetic polyneuropathy, without long-term current use of insulin ([facility])    6. Overweight with body mass index (BMI) of 25 to 25.9 in adult      PLAN:  Detailed discussion with Shivani Galvez regarding situation, options and plans.  severe,  asymptomatic carotid stenosis, prior TIA .  C2 bifurcation, heavy calcification. Carotid intervention is advisable.  Increased risk for Stroke and cardiovascular complications.  Carotid stenting is advisable.    Risks, including but not limited to:  Mortality, major morbidity 1%.  Stroke risk 2-3%.  bleeding, transfusion, infection, pulmonary, renal dysfunction, blood vessel and nerve injury.  Benefits:  reduction of stroke risk  Options: carotid endarterectomy, stenting and medical therapy discussed.   usually overnight stay in hospital if all well. Understands and wishes to proceed.    LEFT Transcarotid artery revascularization, carotid cerebral arteriogram, carotid stent, possible carotid endarterectomy, radial arterial line, GEN  SDS  10/11/2022    Return after above studies complete  Obesity Class  0. Increased risk cardiovascular events, sleep and breathing disorders, joint issues, type  2 diabetes mellitus. Options for weight management, heart healthy diet, exercise programs, and associated health risks of obesity discussed.    Recommended regular physical activity, progressive walking program.  Continue current medications as directed.  Will Obtain relevant old records.  Advance Care Planning   ACP discussion was declined by the patient. Patient does not have an advance directive, declines further assistance.     Thank you for the opportunity to participate in this patient's care.    Copy to primary care provider.

## 2022-10-07 ENCOUNTER — PRE-ADMISSION TESTING (OUTPATIENT)
Dept: PREADMISSION TESTING | Facility: HOSPITAL | Age: 77
End: 2022-10-07

## 2022-10-07 VITALS
RESPIRATION RATE: 18 BRPM | WEIGHT: 155 LBS | OXYGEN SATURATION: 97 % | HEART RATE: 100 BPM | SYSTOLIC BLOOD PRESSURE: 110 MMHG | DIASTOLIC BLOOD PRESSURE: 72 MMHG | BODY MASS INDEX: 24.91 KG/M2 | HEIGHT: 66 IN

## 2022-10-07 DIAGNOSIS — I65.23 CAROTID STENOSIS, ASYMPTOMATIC, BILATERAL: ICD-10-CM

## 2022-10-07 LAB
ABO GROUP BLD: NORMAL
ALBUMIN SERPL-MCNC: 4.9 G/DL (ref 3.5–5.2)
ALBUMIN/GLOB SERPL: 1.9 G/DL
ALP SERPL-CCNC: 79 U/L (ref 39–117)
ALT SERPL W P-5'-P-CCNC: 13 U/L (ref 1–33)
ANION GAP SERPL CALCULATED.3IONS-SCNC: 14 MMOL/L (ref 5–15)
AST SERPL-CCNC: 15 U/L (ref 1–32)
BILIRUB SERPL-MCNC: 0.5 MG/DL (ref 0–1.2)
BLD GP AB SCN SERPL QL: NEGATIVE
BUN SERPL-MCNC: 17 MG/DL (ref 8–23)
BUN/CREAT SERPL: 15.9 (ref 7–25)
CALCIUM SPEC-SCNC: 10.3 MG/DL (ref 8.6–10.5)
CHLORIDE SERPL-SCNC: 102 MMOL/L (ref 98–107)
CO2 SERPL-SCNC: 24 MMOL/L (ref 22–29)
CREAT SERPL-MCNC: 1.07 MG/DL (ref 0.57–1)
DEPRECATED RDW RBC AUTO: 48.3 FL (ref 37–54)
EGFRCR SERPLBLD CKD-EPI 2021: 54.3 ML/MIN/1.73
ERYTHROCYTE [DISTWIDTH] IN BLOOD BY AUTOMATED COUNT: 17.7 % (ref 12.3–15.4)
GLOBULIN UR ELPH-MCNC: 2.6 GM/DL
GLUCOSE SERPL-MCNC: 243 MG/DL (ref 65–99)
HCT VFR BLD AUTO: 38.5 % (ref 34–46.6)
HGB BLD-MCNC: 11.2 G/DL (ref 12–15.9)
Lab: NORMAL
MCH RBC QN AUTO: 22.5 PG (ref 26.6–33)
MCHC RBC AUTO-ENTMCNC: 29.1 G/DL (ref 31.5–35.7)
MCV RBC AUTO: 77.5 FL (ref 79–97)
PLATELET # BLD AUTO: 314 10*3/MM3 (ref 140–450)
PMV BLD AUTO: 9.8 FL (ref 6–12)
POTASSIUM SERPL-SCNC: 4.4 MMOL/L (ref 3.5–5.2)
PROT SERPL-MCNC: 7.5 G/DL (ref 6–8.5)
RBC # BLD AUTO: 4.97 10*6/MM3 (ref 3.77–5.28)
RH BLD: POSITIVE
SODIUM SERPL-SCNC: 140 MMOL/L (ref 136–145)
T&S EXPIRATION DATE: NORMAL
WBC NRBC COR # BLD: 7.46 10*3/MM3 (ref 3.4–10.8)

## 2022-10-07 PROCEDURE — 85027 COMPLETE CBC AUTOMATED: CPT

## 2022-10-07 PROCEDURE — 80053 COMPREHEN METABOLIC PANEL: CPT

## 2022-10-07 PROCEDURE — 93005 ELECTROCARDIOGRAM TRACING: CPT

## 2022-10-07 PROCEDURE — 93010 ELECTROCARDIOGRAM REPORT: CPT | Performed by: INTERNAL MEDICINE

## 2022-10-07 PROCEDURE — 86900 BLOOD TYPING SEROLOGIC ABO: CPT

## 2022-10-07 PROCEDURE — 86850 RBC ANTIBODY SCREEN: CPT

## 2022-10-07 PROCEDURE — 36415 COLL VENOUS BLD VENIPUNCTURE: CPT

## 2022-10-07 PROCEDURE — 86901 BLOOD TYPING SEROLOGIC RH(D): CPT

## 2022-10-07 RX ORDER — ATORVASTATIN CALCIUM 40 MG/1
40 TABLET, FILM COATED ORAL NIGHTLY
COMMUNITY
End: 2023-02-22

## 2022-10-07 NOTE — PAT
Chlorhexidine scrub given with instruction sheet.   Instruction reviewed in PAT, understanding verbalized.    Dr Mcelroy here to speak with pt and review ekg, no order received.

## 2022-10-10 ENCOUNTER — ANESTHESIA EVENT (OUTPATIENT)
Dept: PERIOP | Facility: HOSPITAL | Age: 77
End: 2022-10-10

## 2022-10-10 ENCOUNTER — PREP FOR SURGERY (OUTPATIENT)
Dept: OTHER | Facility: HOSPITAL | Age: 77
End: 2022-10-10

## 2022-10-11 ENCOUNTER — HOSPITAL ENCOUNTER (INPATIENT)
Facility: HOSPITAL | Age: 77
LOS: 1 days | Discharge: HOME | End: 2022-10-12
Attending: THORACIC SURGERY (CARDIOTHORACIC VASCULAR SURGERY) | Admitting: THORACIC SURGERY (CARDIOTHORACIC VASCULAR SURGERY)

## 2022-10-11 ENCOUNTER — ANESTHESIA (OUTPATIENT)
Dept: PERIOP | Facility: HOSPITAL | Age: 77
End: 2022-10-11

## 2022-10-11 ENCOUNTER — APPOINTMENT (OUTPATIENT)
Dept: GENERAL RADIOLOGY | Facility: HOSPITAL | Age: 77
End: 2022-10-11

## 2022-10-11 DIAGNOSIS — I65.23 CAROTID STENOSIS, ASYMPTOMATIC, BILATERAL: ICD-10-CM

## 2022-10-11 LAB
ABO GROUP BLD: NORMAL
BLD GP AB SCN SERPL QL: NEGATIVE
GLUCOSE BLDC GLUCOMTR-MCNC: 128 MG/DL (ref 70–130)
GLUCOSE BLDC GLUCOMTR-MCNC: 149 MG/DL (ref 70–130)
GLUCOSE BLDC GLUCOMTR-MCNC: 166 MG/DL (ref 70–130)
Lab: NORMAL
RH BLD: POSITIVE
T&S EXPIRATION DATE: NORMAL

## 2022-10-11 PROCEDURE — 37215 TRANSCATH STENT CCA W/EPS: CPT | Performed by: THORACIC SURGERY (CARDIOTHORACIC VASCULAR SURGERY)

## 2022-10-11 PROCEDURE — 25010000002 HEPARIN (PORCINE) PER 1000 UNITS: Performed by: THORACIC SURGERY (CARDIOTHORACIC VASCULAR SURGERY)

## 2022-10-11 PROCEDURE — 86900 BLOOD TYPING SEROLOGIC ABO: CPT | Performed by: ANESTHESIOLOGY

## 2022-10-11 PROCEDURE — C1894 INTRO/SHEATH, NON-LASER: HCPCS | Performed by: THORACIC SURGERY (CARDIOTHORACIC VASCULAR SURGERY)

## 2022-10-11 PROCEDURE — 25010000002 KETOROLAC TROMETHAMINE PER 15 MG: Performed by: THORACIC SURGERY (CARDIOTHORACIC VASCULAR SURGERY)

## 2022-10-11 PROCEDURE — 25010000002 FENTANYL CITRATE (PF) 50 MCG/ML SOLUTION

## 2022-10-11 PROCEDURE — 25010000002 CEFAZOLIN PER 500 MG: Performed by: NURSE PRACTITIONER

## 2022-10-11 PROCEDURE — 25010000002 HEPARIN (PORCINE) PER 1000 UNITS: Performed by: ANESTHESIOLOGY

## 2022-10-11 PROCEDURE — 82962 GLUCOSE BLOOD TEST: CPT

## 2022-10-11 PROCEDURE — B3171ZZ FLUOROSCOPY OF LEFT INTERNAL CAROTID ARTERY USING LOW OSMOLAR CONTRAST: ICD-10-PCS | Performed by: THORACIC SURGERY (CARDIOTHORACIC VASCULAR SURGERY)

## 2022-10-11 PROCEDURE — 25010000002 PROTAMINE SULFATE PER 10 MG: Performed by: ANESTHESIOLOGY

## 2022-10-11 PROCEDURE — 25010000002 SUCCINYLCHOLINE PER 20 MG

## 2022-10-11 PROCEDURE — 037L3DZ DILATION OF LEFT INTERNAL CAROTID ARTERY WITH INTRALUMINAL DEVICE, PERCUTANEOUS APPROACH: ICD-10-PCS | Performed by: THORACIC SURGERY (CARDIOTHORACIC VASCULAR SURGERY)

## 2022-10-11 PROCEDURE — C1725 CATH, TRANSLUMIN NON-LASER: HCPCS | Performed by: THORACIC SURGERY (CARDIOTHORACIC VASCULAR SURGERY)

## 2022-10-11 PROCEDURE — C1884 EMBOLIZATION PROTECT SYST: HCPCS | Performed by: THORACIC SURGERY (CARDIOTHORACIC VASCULAR SURGERY)

## 2022-10-11 PROCEDURE — 25010000002 MIDAZOLAM PER 1 MG

## 2022-10-11 PROCEDURE — 94640 AIRWAY INHALATION TREATMENT: CPT

## 2022-10-11 PROCEDURE — 86850 RBC ANTIBODY SCREEN: CPT | Performed by: ANESTHESIOLOGY

## 2022-10-11 PROCEDURE — C1876 STENT, NON-COA/NON-COV W/DEL: HCPCS | Performed by: THORACIC SURGERY (CARDIOTHORACIC VASCULAR SURGERY)

## 2022-10-11 PROCEDURE — 86901 BLOOD TYPING SEROLOGIC RH(D): CPT | Performed by: ANESTHESIOLOGY

## 2022-10-11 PROCEDURE — C1769 GUIDE WIRE: HCPCS | Performed by: THORACIC SURGERY (CARDIOTHORACIC VASCULAR SURGERY)

## 2022-10-11 PROCEDURE — 25010000002 PHENYLEPHRINE 10 MG/ML SOLUTION 5 ML VIAL

## 2022-10-11 PROCEDURE — 25010000002 IOPAMIDOL 61 % SOLUTION: Performed by: THORACIC SURGERY (CARDIOTHORACIC VASCULAR SURGERY)

## 2022-10-11 PROCEDURE — 25010000002 NEOSTIGMINE 10 MG/10ML SOLUTION: Performed by: ANESTHESIOLOGY

## 2022-10-11 PROCEDURE — 76000 FLUOROSCOPY <1 HR PHYS/QHP: CPT

## 2022-10-11 PROCEDURE — 25010000002 ONDANSETRON PER 1 MG: Performed by: THORACIC SURGERY (CARDIOTHORACIC VASCULAR SURGERY)

## 2022-10-11 PROCEDURE — B31B1ZZ FLUOROSCOPY OF LEFT EXTERNAL CAROTID ARTERY USING LOW OSMOLAR CONTRAST: ICD-10-PCS | Performed by: THORACIC SURGERY (CARDIOTHORACIC VASCULAR SURGERY)

## 2022-10-11 PROCEDURE — 37215 TRANSCATH STENT CCA W/EPS: CPT | Performed by: PHYSICIAN ASSISTANT

## 2022-10-11 PROCEDURE — B3141ZZ FLUOROSCOPY OF LEFT COMMON CAROTID ARTERY USING LOW OSMOLAR CONTRAST: ICD-10-PCS | Performed by: THORACIC SURGERY (CARDIOTHORACIC VASCULAR SURGERY)

## 2022-10-11 PROCEDURE — 25010000002 PROPOFOL 10 MG/ML EMULSION

## 2022-10-11 DEVICE — CLIP LIGAT VASC HORIZON TI MD BLU 6CT: Type: IMPLANTABLE DEVICE | Site: CAROTID | Status: FUNCTIONAL

## 2022-10-11 DEVICE — HEMOST ABS SURGIFOAM 8X6.25CM 10MM: Type: IMPLANTABLE DEVICE | Site: CAROTID | Status: FUNCTIONAL

## 2022-10-11 DEVICE — 9 MM X 40 MM
Type: IMPLANTABLE DEVICE | Site: CAROTID | Status: FUNCTIONAL
Brand: ENROUTE TRANSCAROTID STENT

## 2022-10-11 DEVICE — CLIP LIGAT VASC HORIZON TI SM/WD RED 24CT: Type: IMPLANTABLE DEVICE | Site: CAROTID | Status: FUNCTIONAL

## 2022-10-11 RX ORDER — SUCCINYLCHOLINE CHLORIDE 20 MG/ML
INJECTION INTRAMUSCULAR; INTRAVENOUS AS NEEDED
Status: DISCONTINUED | OUTPATIENT
Start: 2022-10-11 | End: 2022-10-11 | Stop reason: SURG

## 2022-10-11 RX ORDER — ATORVASTATIN CALCIUM 40 MG/1
40 TABLET, FILM COATED ORAL NIGHTLY
Status: DISCONTINUED | OUTPATIENT
Start: 2022-10-11 | End: 2022-10-12 | Stop reason: HOSPADM

## 2022-10-11 RX ORDER — SODIUM CHLORIDE, SODIUM GLUCONATE, SODIUM ACETATE, POTASSIUM CHLORIDE AND MAGNESIUM CHLORIDE 526; 502; 368; 37; 30 MG/100ML; MG/100ML; MG/100ML; MG/100ML; MG/100ML
INJECTION, SOLUTION INTRAVENOUS CONTINUOUS PRN
Status: DISCONTINUED | OUTPATIENT
Start: 2022-10-11 | End: 2022-10-11 | Stop reason: SURG

## 2022-10-11 RX ORDER — HEPARIN SODIUM 5000 [USP'U]/ML
INJECTION, SOLUTION INTRAVENOUS; SUBCUTANEOUS AS NEEDED
Status: DISCONTINUED | OUTPATIENT
Start: 2022-10-11 | End: 2022-10-11 | Stop reason: HOSPADM

## 2022-10-11 RX ORDER — ALBUMIN, HUMAN INJ 5% 5 %
500 SOLUTION INTRAVENOUS ONCE
Status: COMPLETED | OUTPATIENT
Start: 2022-10-12 | End: 2022-10-11

## 2022-10-11 RX ORDER — ONDANSETRON 2 MG/ML
4 INJECTION INTRAMUSCULAR; INTRAVENOUS EVERY 6 HOURS PRN
Status: DISCONTINUED | OUTPATIENT
Start: 2022-10-11 | End: 2022-10-12 | Stop reason: HOSPADM

## 2022-10-11 RX ORDER — SODIUM CHLORIDE 9 MG/ML
100 INJECTION, SOLUTION INTRAVENOUS CONTINUOUS
Status: DISCONTINUED | OUTPATIENT
Start: 2022-10-11 | End: 2022-10-11

## 2022-10-11 RX ORDER — NICOTINE POLACRILEX 4 MG
15 LOZENGE BUCCAL
Status: DISCONTINUED | OUTPATIENT
Start: 2022-10-11 | End: 2022-10-12 | Stop reason: HOSPADM

## 2022-10-11 RX ORDER — SODIUM CHLORIDE 9 MG/ML
INJECTION, SOLUTION INTRAVENOUS AS NEEDED
Status: DISCONTINUED | OUTPATIENT
Start: 2022-10-11 | End: 2022-10-11 | Stop reason: HOSPADM

## 2022-10-11 RX ORDER — ONDANSETRON 4 MG/1
4 TABLET, FILM COATED ORAL EVERY 6 HOURS PRN
Status: DISCONTINUED | OUTPATIENT
Start: 2022-10-11 | End: 2022-10-12 | Stop reason: HOSPADM

## 2022-10-11 RX ORDER — BUPIVACAINE HCL/0.9 % NACL/PF 0.1 %
2 PLASTIC BAG, INJECTION (ML) EPIDURAL ONCE
Status: COMPLETED | OUTPATIENT
Start: 2022-10-11 | End: 2022-10-11

## 2022-10-11 RX ORDER — BISACODYL 10 MG
10 SUPPOSITORY, RECTAL RECTAL DAILY PRN
Status: DISCONTINUED | OUTPATIENT
Start: 2022-10-11 | End: 2022-10-12 | Stop reason: HOSPADM

## 2022-10-11 RX ORDER — FENTANYL CITRATE 50 UG/ML
INJECTION, SOLUTION INTRAMUSCULAR; INTRAVENOUS AS NEEDED
Status: DISCONTINUED | OUTPATIENT
Start: 2022-10-11 | End: 2022-10-11 | Stop reason: SURG

## 2022-10-11 RX ORDER — KETOROLAC TROMETHAMINE 15 MG/ML
15 INJECTION, SOLUTION INTRAMUSCULAR; INTRAVENOUS ONCE
Status: COMPLETED | OUTPATIENT
Start: 2022-10-11 | End: 2022-10-11

## 2022-10-11 RX ORDER — PANTOPRAZOLE SODIUM 40 MG/1
40 TABLET, DELAYED RELEASE ORAL
Status: DISCONTINUED | OUTPATIENT
Start: 2022-10-12 | End: 2022-10-12 | Stop reason: HOSPADM

## 2022-10-11 RX ORDER — AMOXICILLIN 250 MG
2 CAPSULE ORAL 2 TIMES DAILY PRN
Status: DISCONTINUED | OUTPATIENT
Start: 2022-10-11 | End: 2022-10-12 | Stop reason: HOSPADM

## 2022-10-11 RX ORDER — CITALOPRAM 40 MG/1
40 TABLET ORAL DAILY
Status: DISCONTINUED | OUTPATIENT
Start: 2022-10-12 | End: 2022-10-12 | Stop reason: HOSPADM

## 2022-10-11 RX ORDER — MIDAZOLAM HYDROCHLORIDE 1 MG/ML
INJECTION INTRAMUSCULAR; INTRAVENOUS AS NEEDED
Status: DISCONTINUED | OUTPATIENT
Start: 2022-10-11 | End: 2022-10-11 | Stop reason: SURG

## 2022-10-11 RX ORDER — ACETAMINOPHEN 325 MG/1
650 TABLET ORAL EVERY 4 HOURS PRN
Status: DISCONTINUED | OUTPATIENT
Start: 2022-10-11 | End: 2022-10-12 | Stop reason: HOSPADM

## 2022-10-11 RX ORDER — NEOSTIGMINE METHYLSULFATE 1 MG/ML
INJECTION, SOLUTION INTRAVENOUS AS NEEDED
Status: DISCONTINUED | OUTPATIENT
Start: 2022-10-11 | End: 2022-10-11 | Stop reason: SURG

## 2022-10-11 RX ORDER — PROPOFOL 10 MG/ML
VIAL (ML) INTRAVENOUS AS NEEDED
Status: DISCONTINUED | OUTPATIENT
Start: 2022-10-11 | End: 2022-10-11 | Stop reason: SURG

## 2022-10-11 RX ORDER — PROTAMINE SULFATE 10 MG/ML
INJECTION, SOLUTION INTRAVENOUS AS NEEDED
Status: DISCONTINUED | OUTPATIENT
Start: 2022-10-11 | End: 2022-10-11 | Stop reason: SURG

## 2022-10-11 RX ORDER — DEXTROSE MONOHYDRATE 25 G/50ML
25 INJECTION, SOLUTION INTRAVENOUS
Status: DISCONTINUED | OUTPATIENT
Start: 2022-10-11 | End: 2022-10-12 | Stop reason: HOSPADM

## 2022-10-11 RX ORDER — CLOPIDOGREL BISULFATE 75 MG/1
75 TABLET ORAL DAILY
Status: DISCONTINUED | OUTPATIENT
Start: 2022-10-12 | End: 2022-10-12 | Stop reason: HOSPADM

## 2022-10-11 RX ORDER — SODIUM CHLORIDE 9 MG/ML
75 INJECTION, SOLUTION INTRAVENOUS CONTINUOUS
Status: DISCONTINUED | OUTPATIENT
Start: 2022-10-11 | End: 2022-10-12 | Stop reason: HOSPADM

## 2022-10-11 RX ORDER — HEPARIN SODIUM 1000 [USP'U]/ML
INJECTION, SOLUTION INTRAVENOUS; SUBCUTANEOUS AS NEEDED
Status: DISCONTINUED | OUTPATIENT
Start: 2022-10-11 | End: 2022-10-11 | Stop reason: SURG

## 2022-10-11 RX ORDER — NITROGLYCERIN 20 MG/100ML
5-200 INJECTION INTRAVENOUS
Status: DISCONTINUED | OUTPATIENT
Start: 2022-10-11 | End: 2022-10-12 | Stop reason: HOSPADM

## 2022-10-11 RX ORDER — ASPIRIN 81 MG/1
81 TABLET, CHEWABLE ORAL DAILY
COMMUNITY

## 2022-10-11 RX ORDER — ROCURONIUM BROMIDE 10 MG/ML
INJECTION, SOLUTION INTRAVENOUS AS NEEDED
Status: DISCONTINUED | OUTPATIENT
Start: 2022-10-11 | End: 2022-10-11 | Stop reason: SURG

## 2022-10-11 RX ORDER — ALBUTEROL SULFATE 2.5 MG/3ML
2.5 SOLUTION RESPIRATORY (INHALATION)
Status: DISCONTINUED | OUTPATIENT
Start: 2022-10-11 | End: 2022-10-12 | Stop reason: HOSPADM

## 2022-10-11 RX ORDER — BUPIVACAINE HCL/0.9 % NACL/PF 0.1 %
2 PLASTIC BAG, INJECTION (ML) EPIDURAL EVERY 8 HOURS
Status: COMPLETED | OUTPATIENT
Start: 2022-10-12 | End: 2022-10-12

## 2022-10-11 RX ORDER — ASPIRIN 81 MG/1
81 TABLET, CHEWABLE ORAL DAILY
Status: DISCONTINUED | OUTPATIENT
Start: 2022-10-12 | End: 2022-10-12 | Stop reason: HOSPADM

## 2022-10-11 RX ORDER — LATANOPROST 50 UG/ML
1 SOLUTION/ DROPS OPHTHALMIC NIGHTLY
Status: DISCONTINUED | OUTPATIENT
Start: 2022-10-11 | End: 2022-10-12 | Stop reason: HOSPADM

## 2022-10-11 RX ORDER — INSULIN ASPART 100 [IU]/ML
0-14 INJECTION, SOLUTION INTRAVENOUS; SUBCUTANEOUS
Status: DISCONTINUED | OUTPATIENT
Start: 2022-10-11 | End: 2022-10-12 | Stop reason: HOSPADM

## 2022-10-11 RX ORDER — HYDROCODONE BITARTRATE AND ACETAMINOPHEN 5; 325 MG/1; MG/1
1 TABLET ORAL EVERY 4 HOURS PRN
Status: DISCONTINUED | OUTPATIENT
Start: 2022-10-11 | End: 2022-10-12 | Stop reason: HOSPADM

## 2022-10-11 RX ADMIN — SODIUM CHLORIDE 75 ML/HR: 9 INJECTION, SOLUTION INTRAVENOUS at 19:39

## 2022-10-11 RX ADMIN — SODIUM CHLORIDE, SODIUM GLUCONATE, SODIUM ACETATE, POTASSIUM CHLORIDE AND MAGNESIUM CHLORIDE: 526; 502; 368; 37; 30 INJECTION, SOLUTION INTRAVENOUS at 16:15

## 2022-10-11 RX ADMIN — ALBUMIN (HUMAN) 500 ML: 12.5 INJECTION, SOLUTION INTRAVENOUS at 23:28

## 2022-10-11 RX ADMIN — ONDANSETRON 4 MG: 2 INJECTION INTRAMUSCULAR; INTRAVENOUS at 18:11

## 2022-10-11 RX ADMIN — KETOROLAC TROMETHAMINE 15 MG: 15 INJECTION, SOLUTION INTRAMUSCULAR; INTRAVENOUS at 18:49

## 2022-10-11 RX ADMIN — PROPOFOL 30 MG: 10 INJECTION, EMULSION INTRAVENOUS at 18:04

## 2022-10-11 RX ADMIN — Medication 2 G: at 16:37

## 2022-10-11 RX ADMIN — ROCURONIUM BROMIDE 5 MG: 10 INJECTION INTRAVENOUS at 16:19

## 2022-10-11 RX ADMIN — GLYCOPYRROLATE 0.1 MCG: 0.2 INJECTION, SOLUTION INTRAMUSCULAR; INTRAVITREAL at 18:03

## 2022-10-11 RX ADMIN — HEPARIN SODIUM 7000 UNITS: 1000 INJECTION, SOLUTION INTRAVENOUS; SUBCUTANEOUS at 17:08

## 2022-10-11 RX ADMIN — LATANOPROST 1 DROP: 50 SOLUTION OPHTHALMIC at 20:49

## 2022-10-11 RX ADMIN — FENTANYL CITRATE 25 MCG: 50 INJECTION INTRAMUSCULAR; INTRAVENOUS at 17:17

## 2022-10-11 RX ADMIN — CEFAZOLIN 2 G: 10 INJECTION, POWDER, FOR SOLUTION INTRAVENOUS; PARENTERAL at 23:31

## 2022-10-11 RX ADMIN — SUCCINYLCHOLINE CHLORIDE 140 MG: 20 INJECTION, SOLUTION INTRAMUSCULAR; INTRAVENOUS at 16:19

## 2022-10-11 RX ADMIN — FENTANYL CITRATE 50 MCG: 50 INJECTION INTRAMUSCULAR; INTRAVENOUS at 16:13

## 2022-10-11 RX ADMIN — SODIUM CHLORIDE 100 ML/HR: 9 INJECTION, SOLUTION INTRAVENOUS at 11:09

## 2022-10-11 RX ADMIN — PROPOFOL 120 MG: 10 INJECTION, EMULSION INTRAVENOUS at 16:19

## 2022-10-11 RX ADMIN — ROCURONIUM BROMIDE 25 MG: 10 INJECTION INTRAVENOUS at 16:30

## 2022-10-11 RX ADMIN — ROCURONIUM BROMIDE 10 MG: 10 INJECTION INTRAVENOUS at 16:59

## 2022-10-11 RX ADMIN — MIDAZOLAM HYDROCHLORIDE 1 MG: 1 INJECTION, SOLUTION INTRAMUSCULAR; INTRAVENOUS at 16:05

## 2022-10-11 RX ADMIN — NEOSTIGMINE METHYLSULFATE 2 MG: 0.5 INJECTION INTRAVENOUS at 18:03

## 2022-10-11 RX ADMIN — PROTAMINE SULFATE 20 MG: 10 INJECTION, SOLUTION INTRAVENOUS at 17:46

## 2022-10-11 RX ADMIN — LIDOCAINE HYDROCHLORIDE 60 MG: 20 INJECTION INTRAVENOUS at 16:19

## 2022-10-11 RX ADMIN — FENTANYL CITRATE 25 MCG: 50 INJECTION INTRAMUSCULAR; INTRAVENOUS at 16:59

## 2022-10-11 RX ADMIN — ALBUTEROL SULFATE 2.5 MG: 2.5 SOLUTION RESPIRATORY (INHALATION) at 22:38

## 2022-10-11 RX ADMIN — PHENYLEPHRINE HYDROCHLORIDE 0.5 MCG/MIN: 10 INJECTION INTRAVENOUS at 16:55

## 2022-10-11 RX ADMIN — SODIUM CHLORIDE, SODIUM GLUCONATE, SODIUM ACETATE, POTASSIUM CHLORIDE AND MAGNESIUM CHLORIDE: 526; 502; 368; 37; 30 INJECTION, SOLUTION INTRAVENOUS at 18:05

## 2022-10-11 RX ADMIN — ATORVASTATIN CALCIUM 40 MG: 40 TABLET, FILM COATED ORAL at 20:38

## 2022-10-11 RX ADMIN — GLYCOPYRROLATE 0.2 MCG: 0.2 INJECTION, SOLUTION INTRAMUSCULAR; INTRAVITREAL at 17:01

## 2022-10-11 RX ADMIN — SODIUM CHLORIDE, SODIUM GLUCONATE, SODIUM ACETATE, POTASSIUM CHLORIDE AND MAGNESIUM CHLORIDE: 526; 502; 368; 37; 30 INJECTION, SOLUTION INTRAVENOUS at 16:50

## 2022-10-11 RX ADMIN — HYDROCODONE BITARTRATE AND ACETAMINOPHEN 1 TABLET: 5; 325 TABLET ORAL at 20:38

## 2022-10-11 NOTE — ANESTHESIA POSTPROCEDURE EVALUATION
Patient: Shivani Galvez    Procedure Summary     Date: 10/11/22 Room / Location: [facility] OR  /  [facility] OR    Anesthesia Start: 1606 Anesthesia Stop: 1835    Procedure: TRANSCAROTID ARTERY REVASCULARIZATION               (C-ARM#2 AND C-ARM TABLE)              (start @13:00 per sukhjinder) (Left: Neck) Diagnosis:       Carotid stenosis, asymptomatic, bilateral      (Carotid stenosis, asymptomatic, bilateral [I65.23])    Surgeons: Efren Keenan MD Provider: Paige Mcelroy DO    Anesthesia Type: general, Aretha ASA Status: 3          Anesthesia Type: general, Aretha    Vitals  No vitals data found for the desired time range.          Post Anesthesia Care and Evaluation    Patient location during evaluation: PACU  Patient participation: complete - patient participated  Level of consciousness: awake and awake and alert  Pain score: 0  Pain management: satisfactory to patient    Airway patency: patent  Anesthetic complications: No anesthetic complications  PONV Status: none  Cardiovascular status: acceptable  Respiratory status: acceptable and airway suctioned  Hydration status: acceptable    Comments: 121/64  100% on 2LNC  HR94 in atrial fibrillation  16=RR  97.1=temp  No anesthesia care post op

## 2022-10-11 NOTE — ANESTHESIA PREPROCEDURE EVALUATION
Anesthesia Evaluation     Patient summary reviewed and Nursing notes reviewed   no history of anesthetic complications:  NPO Solid Status: > 8 hours  NPO Liquid Status: > 4 hours           Airway   Mallampati: II  TM distance: >3 FB  Neck ROM: full  No difficulty expected  Dental    (+) upper dentures, lower dentures and edentulous    Pulmonary     breath sounds clear to auscultation  (+) a smoker Former,   (-) COPD, asthma, sleep apnea, rhonchi, decreased breath sounds, wheezes, no home oxygen  Cardiovascular   Exercise tolerance: good (4-7 METS)    ECG reviewed  PT is on anticoagulation therapy  Rhythm: irregular  Rate: normal    (+) hypertension less than 2 medications, valvular problems/murmurs MR, dysrhythmias Atrial Fib, hyperlipidemia,  carotid artery disease carotid bilateral  (-) pacemaker, past MI, angina, CHF, BOYKIN, murmur, cardiac stents, CABG, PVD, DVT    ROS comment: EKG 10/7/2022:  Atrial fibrillation  Poor R-wave Progression  Nonspecific ST abnormality    TTE 5/31/2022:  Left ventricular wall thickness is consistent with mild to moderate concentric hypertrophy.  Left ventricular ejection fraction appears to be 51 - 55%.  Left ventricular diastolic function was normal.  There is moderate calcification of the aortic valve mainly affecting the non-coronary and right coronary cusp(s).      Neuro/Psych  (+) TIA,    (-) seizures, CVA  GI/Hepatic/Renal/Endo    (+)  GERD well controlled, GI bleeding lower resolved, renal disease CRI, diabetes mellitus type 2 poorly controlled,   (-)  obesity, morbid obesity    Musculoskeletal     (+) back pain,   Abdominal  - normal exam   Substance History      OB/GYN negative ob/gyn ROS         Other   arthritis,      ROS/Med Hx Other: Hgb=11.2, T&S    TIA in 2006: She had visual difficulties (double vision) at this time which resolved.No sequelae    8/2022 Carotid Duplex:  CANDACE >70% (407/149cm/s, ratio 7.4), LICA 50-69% (169/59cm/s, ratio 1.2) antegrade verts.  8/2022 CTA  Carotids:  CANDACE subtotal occlusion/string sign, RCCA 30%, RSCA 50%, vert widely patent.  LICA 80% prox, LSCA 30%, vert 60% prox.     6/2022 Echocardiogram:  EF 50%, LA 41mm, LV 37mm.  Mild MR, trace TR.  9/2022 ECG:  Afib 102, QTc 404, septal infarct.    May 2022 pt was diagnosed with atrial fibrillation.She was not started on anticoagulation because of history of bleeding in 2019 due to GI bleed that required blood transfusion. Pt stopped taking eliquis 10/7/2022.  Plavix taken today. Diltiazem for rate control.     GERD controlled on daily omeprazole.     Hx of DM: last MheY4H=8.0                  Anesthesia Plan    ASA 3     general and Aretha     (Discussed arterial line & another IV possible central line & patient understands possible complications, risks, & agrees.)  intravenous induction     Anesthetic plan, risks, benefits, and alternatives have been provided, discussed and informed consent has been obtained with: patient and child.  Pre-procedure education provided  Use of blood products discussed with patient  Consented to blood products.   Plan discussed with CRNA.        CODE STATUS:

## 2022-10-11 NOTE — ANESTHESIA PROCEDURE NOTES
Airway  Urgency: elective    Airway not difficult    General Information and Staff    Patient location during procedure: OR  CRNA/CAA: Kerry Diaz CRNA    Indications and Patient Condition  Indications for airway management: airway protection    Preoxygenated: yes  Mask difficulty assessment: 1 - vent by mask    Final Airway Details  Final airway type: endotracheal airway      Successful airway: ETT  Cuffed: yes   Successful intubation technique: direct laryngoscopy  Facilitating devices/methods: intubating stylet  Endotracheal tube insertion site: oral  Blade: Temitope  Blade size: 3  ETT size (mm): 7.5  Cormack-Lehane Classification: grade I - full view of glottis  Placement verified by: chest auscultation and capnometry   Cuff volume (mL): 7  Measured from: lips  ETT/EBT  to lips (cm): 20  Number of attempts at approach: 1  Assessment: lips, teeth, and gum same as pre-op and atraumatic intubation

## 2022-10-11 NOTE — ANESTHESIA PROCEDURE NOTES
Peripheral IV    Start time: 10/11/2022 4:50 PM  End time: 10/11/2022 4:55 PM  Performed By   Anesthesiologist: Paige Mcelroy DO  Peripheral IV Prep   Patient position: supine   Prep: ChloraPrep  Patient monitoring: heart rate, cardiac monitor and continuous pulse ox  Peripheral IV Procedure   Laterality:left  Location:  Hand  Catheter size: 18 G          Post Assessment   Dressing Type: tape and transparent.    IV Dressing/Site: clean, dry and intact

## 2022-10-11 NOTE — ANESTHESIA PROCEDURE NOTES
Arterial Line      Start time: 10/11/2022 4:50 PM  Stop Time:10/11/2022 4:55 PM       Performed By   Anesthesiologist: Paige Mcelroy DO   Preanesthetic Checklist  Completed: patient identified, IV checked, site marked, risks and benefits discussed, surgical consent, monitors and equipment checked, pre-op evaluation and timeout performed  Arterial Line Prep    Sterile Tech: cap, gloves and sterile barriers  Prep: ChloraPrep  Patient monitoring: EKG, continuous pulse oximetry and blood pressure monitoring  Arterial Line Procedure   Laterality:right  Location:  radial artery  Catheter size: 20 G   Guidance: ultrasound guided  PROCEDURE NOTE/ULTRASOUND INTERPRETATION.  Using ultrasound guidance the potential vascular sites for insertion of the catheter were visualized to determine the patency of the vessel to be used for vascular access.  After selecting the appropriate site for insertion, the needle was visualized under ultrasound being inserted into the radial artery, followed by ultrasound confirmation of wire and catheter placement. There were no abnormalities seen on ultrasound; an image was taken; and the patient tolerated the procedure with no complications.   Number of attempts: 1  Successful placement: yes   Post Assessment   Dressing Type: wrist guard applied, secured with tape and occlusive dressing applied.   Complications no  Circ/Move/Sens Assessment: normal and unchanged.   Patient Tolerance: patient tolerated the procedure well with no apparent complications

## 2022-10-12 ENCOUNTER — READMISSION MANAGEMENT (OUTPATIENT)
Dept: CALL CENTER | Facility: HOSPITAL | Age: 77
End: 2022-10-12

## 2022-10-12 VITALS
SYSTOLIC BLOOD PRESSURE: 114 MMHG | RESPIRATION RATE: 14 BRPM | WEIGHT: 161 LBS | BODY MASS INDEX: 25.88 KG/M2 | TEMPERATURE: 98 F | OXYGEN SATURATION: 97 % | DIASTOLIC BLOOD PRESSURE: 61 MMHG | HEIGHT: 66 IN | HEART RATE: 96 BPM

## 2022-10-12 LAB — GLUCOSE BLDC GLUCOMTR-MCNC: 158 MG/DL (ref 70–130)

## 2022-10-12 PROCEDURE — 25010000002 ALBUMIN HUMAN 5% PER 50 ML: Performed by: THORACIC SURGERY (CARDIOTHORACIC VASCULAR SURGERY)

## 2022-10-12 PROCEDURE — 99024 POSTOP FOLLOW-UP VISIT: CPT | Performed by: NURSE PRACTITIONER

## 2022-10-12 PROCEDURE — 25010000002 CEFAZOLIN PER 500 MG: Performed by: THORACIC SURGERY (CARDIOTHORACIC VASCULAR SURGERY)

## 2022-10-12 PROCEDURE — 82962 GLUCOSE BLOOD TEST: CPT

## 2022-10-12 PROCEDURE — P9041 ALBUMIN (HUMAN),5%, 50ML: HCPCS | Performed by: THORACIC SURGERY (CARDIOTHORACIC VASCULAR SURGERY)

## 2022-10-12 RX ADMIN — ACETAMINOPHEN 650 MG: 325 TABLET, FILM COATED ORAL at 05:56

## 2022-10-12 RX ADMIN — PANTOPRAZOLE SODIUM 40 MG: 40 TABLET, DELAYED RELEASE ORAL at 05:56

## 2022-10-12 RX ADMIN — ASPIRIN 81 MG: 81 TABLET, CHEWABLE ORAL at 09:02

## 2022-10-12 RX ADMIN — CEFAZOLIN 2 G: 10 INJECTION, POWDER, FOR SOLUTION INTRAVENOUS; PARENTERAL at 08:04

## 2022-10-12 RX ADMIN — CITALOPRAM 40 MG: 40 TABLET, FILM COATED ORAL at 09:02

## 2022-10-12 RX ADMIN — LINAGLIPTIN 5 MG: 5 TABLET, FILM COATED ORAL at 09:02

## 2022-10-12 RX ADMIN — CLOPIDOGREL BISULFATE 75 MG: 75 TABLET ORAL at 09:02

## 2022-10-12 RX ADMIN — HYDROCODONE BITARTRATE AND ACETAMINOPHEN 1 TABLET: 5; 325 TABLET ORAL at 07:30

## 2022-10-13 DIAGNOSIS — Z48.812 SURGICAL AFTERCARE, CIRCULATORY SYSTEM: Primary | ICD-10-CM

## 2022-10-13 RX ORDER — HYDROCODONE BITARTRATE AND ACETAMINOPHEN 5; 325 MG/1; MG/1
1 TABLET ORAL EVERY 6 HOURS PRN
Qty: 16 TABLET | Refills: 0 | Status: SHIPPED | OUTPATIENT
Start: 2022-10-13 | End: 2022-10-19

## 2022-10-13 NOTE — OUTREACH NOTE
Prep Survey    Flowsheet Row Responses   [facility] facility patient discharged from? [place]   Is LACE score < 7 ? No   Emergency Room discharge w/ pulse ox? No   Eligibility Readm Mgmt   Discharge diagnosis Carotid stenosis s/p TCAR   Does the patient have one of the following disease processes/diagnoses(primary or secondary)? General Surgery   Does the patient have Home health ordered? No   Is there a DME ordered? No   Prep survey completed? Yes          STEVIE BOWLES - Registered Nurse

## 2022-10-19 ENCOUNTER — OFFICE VISIT (OUTPATIENT)
Dept: CARDIAC SURGERY | Facility: CLINIC | Age: 77
End: 2022-10-19

## 2022-10-19 VITALS
HEIGHT: 66 IN | SYSTOLIC BLOOD PRESSURE: 100 MMHG | TEMPERATURE: 98.2 F | DIASTOLIC BLOOD PRESSURE: 60 MMHG | WEIGHT: 161 LBS | OXYGEN SATURATION: 98 % | BODY MASS INDEX: 25.88 KG/M2 | HEART RATE: 90 BPM

## 2022-10-19 DIAGNOSIS — E78.2 MIXED HYPERLIPIDEMIA: ICD-10-CM

## 2022-10-19 DIAGNOSIS — Z48.812 SURGICAL AFTERCARE, CIRCULATORY SYSTEM: Primary | ICD-10-CM

## 2022-10-19 DIAGNOSIS — I48.91 ATRIAL FIBRILLATION WITH RVR: ICD-10-CM

## 2022-10-19 DIAGNOSIS — I65.23 CAROTID STENOSIS, ASYMPTOMATIC, BILATERAL: ICD-10-CM

## 2022-10-19 DIAGNOSIS — Z79.01 CURRENT USE OF LONG TERM ANTICOAGULATION: ICD-10-CM

## 2022-10-19 PROCEDURE — 99024 POSTOP FOLLOW-UP VISIT: CPT | Performed by: NURSE PRACTITIONER

## 2022-10-19 NOTE — PROGRESS NOTES
Shivani Overton [place]  1945    Chief Complaint:    Chief Complaint   Patient presents with   • Carotid Artery Disease       HPI:      PCP:  Silvana Rangel APRN  Cardiology:  Dr Barnett     77 y.o. female with Hyperlipidemia(stable, increased risk cardiovascular events), Diabetes Mellitus(stable, increased risk cardiovascular events), Obesity(uncontrolled, increased risk cardiovascular events) and Atrial fibrillation(stable, increased risk stroke) , carotid stenosis(new, chronic severe progression, increase risk stroke).  former smoker.  Asymptomatic carotid stenosis.  Afib.  remote TIA 2006. Back trouble, ruptured disk, no operation planned .  No stroke amaurosis.  No MI claudication. No other associated signs, symptoms or modifying factors.    8/2022 Carotid Duplex:  CANDACE >70% (407/149cm/s, ratio 7.4), LICA 50-69% (169/59cm/s, ratio 1.2) antegrade verts.  8/2022 CTA Carotids:  CANDACE subtotal occlusion/string sign, RCCA 30%, RSCA 50%, vert widely patent.  LICA 80% prox, LSCA 30%, vert 60% prox.  10/11/22:  LEFT TCAR    6/2022 Echocardiogram:  EF 50%, LA 41mm, LV 37mm.  Mild MR, trace TR.  9/2022 ECG:  Afib 102, QTc 404, septal infarct.    The following portions of the patient's history were reviewed and updated as appropriate: allergies, current medications, past family history, past medical history, past social history, past surgical history and problem list.  Recent images independently reviewed.  Available laboratory values reviewed.    PMH:  Past Medical History:   Diagnosis Date   • A-fib (HCC)    • Anemia    • Arthritis    • Carotid stenosis, asymptomatic, bilateral 09/20/2022   • Diabetes mellitus (HCC)    • GERD (gastroesophageal reflux disease)    • History of transfusion    • Hyperlipidemia    • Hypertension    • Renal insufficiency    • TIA (transient ischemic attack)      Past Surgical History:   Procedure Laterality Date   • COLONOSCOPY N/A 01/17/2019    Procedure: COLONOSCOPY;  Surgeon: Last  Lucio MCGREGOR DO;  Location: [facility] ENDOSCOPY;  Service: Gastroenterology   • TUBAL ABDOMINAL LIGATION       History reviewed. No pertinent family history.  Social History     Tobacco Use   • Smoking status: Former     Packs/day: 2.00     Types: Cigarettes     Quit date: 2015     Years since quittin.8   • Smokeless tobacco: Never   Vaping Use   • Vaping Use: Never used   Substance Use Topics   • Alcohol use: No   • Drug use: No       ALLERGIES:  Allergies   Allergen Reactions   • Asa [Aspirin] Other (See Comments)     Bleeding          MEDICATIONS:    Current Outpatient Medications:   •  acetaminophen (TYLENOL) 650 MG 8 hr tablet, Take 650 mg by mouth 3 (Three) Times a Day As Needed., Disp: , Rfl:   •  alendronate (FOSAMAX) 70 MG tablet, Take 70 mg by mouth 1 (One) Time Per Week., Disp: , Rfl:   •  apixaban (ELIQUIS) 2.5 MG tablet tablet, Take 1 tablet by mouth 2 (Two) Times a Day., Disp: 60 tablet, Rfl: 0  •  aspirin 81 MG chewable tablet, Chew 1 tablet Daily. Started on 10/5/22 for upcoming surgery, Disp: , Rfl:   •  atorvastatin (LIPITOR) 40 MG tablet, Take 40 mg by mouth Every Night., Disp: , Rfl:   •  calcium citrate-vitamin D (CITRACAL+D) 315-200 MG-UNIT per tablet, Take 1 tablet by mouth 2 (Two) Times a Day., Disp: , Rfl:   •  citalopram (CeleXA) 40 MG tablet, Take 1 tablet by mouth Daily., Disp: , Rfl:   •  clopidogrel (Plavix) 75 MG tablet, Take 1 tablet by mouth Daily for 30 days. Indications: Carotid Artery Stenting, Disp: 30 tablet, Rfl: 0  •  dilTIAZem CD (CARDIZEM CD) 180 MG 24 hr capsule, Take 1 capsule by mouth Daily for 90 days., Disp: 90 capsule, Rfl: 2  •  empagliflozin (JARDIANCE) 25 MG tablet tablet, Take 25 mg by mouth Daily., Disp: , Rfl:   •  ferrous gluconate (FERGON) 324 MG tablet, Take 324 mg by mouth Daily With Breakfast., Disp: , Rfl:   •  Lancet Devices (ACCU-CHEK SOFTCLIX) lancets, 1 Units., Disp: , Rfl:   •  latanoprost (XALATAN) 0.005 % ophthalmic solution, Administer 1 drop to  "both eyes Every Night., Disp: , Rfl:   •  metFORMIN (GLUCOPHAGE) 500 MG tablet, Take 1,000 mg by mouth 2 (Two) Times a Day. 2 tabs, Disp: , Rfl:   •  omeprazole (priLOSEC) 20 MG capsule, Take 20 mg by mouth Daily As Needed., Disp: , Rfl:   •  SITagliptin (JANUVIA) 100 MG tablet, Take 100 mg by mouth Daily., Disp: , Rfl:   •  vitamin D3 125 MCG (5000 UT) capsule capsule, Take 1 capsule by mouth Daily., Disp: , Rfl:     Review of Systems   Review of Systems   Constitutional: Negative for malaise/fatigue and weight loss.   Cardiovascular: Positive for irregular heartbeat and palpitations. Negative for chest pain, claudication and dyspnea on exertion.   Respiratory: Negative for cough and shortness of breath.    Skin: Negative for color change and poor wound healing.   Musculoskeletal: Positive for arthritis, back pain and myalgias.   Neurological: Positive for numbness and paresthesias. Negative for dizziness and weakness.       Physical Exam   Vitals:    10/19/22 1324   BP: 100/60   BP Location: Left arm   Patient Position: Sitting   Cuff Size: Adult   Pulse: 90   Temp: 98.2 °F (36.8 °C)   TempSrc: Temporal   SpO2: 98%   Weight: 73 kg (161 lb)   Height: 167.6 cm (66\")     Body surface area is 1.82 meters squared.  Body mass index is 25.99 kg/m².  Physical Exam  Constitutional:       General: She is not in acute distress.     Appearance: She is not ill-appearing.   HENT:      Right Ear: Hearing normal.      Left Ear: Hearing normal.      Nose: No nasal deformity.      Mouth/Throat:      Dentition: Normal dentition. Does not have dentures.   Neck:      Vascular: No carotid bruit.      Comments: Inc: CDI  Cardiovascular:      Rate and Rhythm: Normal rate. Rhythm irregularly irregular.      Pulses:           Carotid pulses are 2+ on the right side with bruit and 2+ on the left side with bruit.       Radial pulses are 2+ on the right side and 2+ on the left side.        Dorsalis pedis pulses are 2+ on the right side and 2+ " on the left side.        Posterior tibial pulses are 1+ on the right side and 1+ on the left side.      Heart sounds: No murmur heard.  Pulmonary:      Effort: Pulmonary effort is normal.      Breath sounds: Normal breath sounds.   Abdominal:      General: There is no distension.      Palpations: Abdomen is soft. There is no mass.      Tenderness: There is no abdominal tenderness.   Musculoskeletal:         General: No deformity.      Comments: Gait normal.    Skin:     General: Skin is warm and dry.      Capillary Refill: Capillary refill takes less than 2 seconds.      Coloration: Skin is not pale.      Findings: No erythema.      Comments: Groin soft   Neurological:      General: No focal deficit present.      Mental Status: She is alert and oriented to person, place, and time. Mental status is at baseline.   Psychiatric:         Speech: Speech normal.         Behavior: Behavior is cooperative.         Thought Content: Thought content normal.         Judgment: Judgment normal.         BUN   Date Value Ref Range Status   10/07/2022 17 8 - 23 mg/dL Final   08/08/2022 15 7 - 25 mg/dL Final     Creatinine   Date Value Ref Range Status   10/07/2022 1.07 (H) 0.57 - 1.00 mg/dL Final   08/08/2022 1.0 0.7 - 1.3 mg/dL Final     eGFR Non  Amer   Date Value Ref Range Status   01/03/2019 51 39 - 90 mL/min/1.73 Final       ASSESSMENT:PLAN  Diagnoses and all orders for this visit:    1. Surgical aftercare, circulatory system (Primary)  Stable Post Op LEFT TCAR: Progressing well  Signs and symptoms of infection including drainage from operative site, redness, swelling, with associated fever and/or chills notify Heart and Vascular center immediately for wound check.    Return 4 weeks     MEDICATION INSTRUCTIONS: 11/14/22  -Stop Plavix  -Increase Eliquis 5mg twice daily  -continue ASA 81mg    2. Carotid stenosis, asymptomatic, bilateral  Medical Management: Aspirin, Plavix, Statin, Eliquis (low dose)    -     Duplex Carotid  - Left Ultrasound CAR; Future    3. Atrial fibrillation with RVR (HCC)  Rate controlled.   Indication for  Anticoagulation    4. Mixed hyperlipidemia  Lipid-lowering therapy has been proven beneficial in patients with cardio-vascular disease. Current guidelines recommend statin treatment for all patients with PAD,CAD and carotid stenosis. Statins are beneficial in preventing cardiovascular events, increasing functional capacity and lower the risk of adverse limb loss in PAD. Statins decrease the progression of plaque formation and may improve peripheral vessel lining, and aid in reversing atherosclerosis.     5. Current use of long term anticoagulation  Notify provider if you experience excessive bleeding from the nose, cuts, gums, rectum, urinary tract, or vagina. Reddish or brown urine or stool. Vomiting of blood or hemorrhoidal bleeding. If major injury occurs present to the Emergency Department.      Advance Care Planning discussed and  Informational packet given to patient. Advance Care Plan on file: No    Your BMI is Body mass index is 25.99 kg/m². and falls within  Overweight: 25.0-29.9kg/m2 . BMI is strongly correlated with increased health risks. Review options for weight management, heart healthy diet, and exercise programs.           This document has been electronically signed by INDERJIT Israel on October 19, 2022 13:40 CDT

## 2022-10-19 NOTE — PATIENT INSTRUCTIONS
Stable Post Op LEFT TCAR: Progressing well  Medical Management: Aspirin, Plavix, Statin, Eliquis (low dose)  Signs and symptoms of infection including drainage from operative site, redness, swelling, with associated fever and/or chills notify Heart and Vascular center immediately for wound check.    Return 4 weeks     MEDICATION INSTRUCTIONS: 11/14/22  -Stop Plavix  -Increase Eliquis 5mg twice daily  -continue ASA 81mg

## 2022-10-21 ENCOUNTER — READMISSION MANAGEMENT (OUTPATIENT)
Dept: CALL CENTER | Facility: HOSPITAL | Age: 77
End: 2022-10-21

## 2022-10-21 NOTE — OUTREACH NOTE
General Surgery Week 2 Survey    Flowsheet Row Responses   [facility] patient discharged from? [place]   Does the patient have one of the following disease processes/diagnoses(primary or secondary)? General Surgery   Week 2 attempt successful? Yes   Call start time 1550   Call end time 1552   Discharge diagnosis Carotid stenosis s/p TCAR   Person spoke with today (if not patient) and relationship patient   Meds reviewed with patient/caregiver? Yes   Is the patient having any side effects they believe may be caused by any medication additions or changes? No   Does the patient have all medications related to this admission filled (includes all antibiotics, pain medications, etc.) Yes   Is the patient taking all medications as directed (includes completed medication regime)? Yes   Does the patient have a follow up appointment scheduled with their surgeon? Yes   Has the patient kept scheduled appointments due by today? Yes   Comments Pt has u/s on Nov 15th for opposite side.   Psychosocial issues? No   Did the patient receive a copy of their discharge instructions? Yes   Nursing interventions Reviewed instructions with patient   What is the patient's perception of their health status since discharge? Improving   If the patient is a current smoker, are they able to teach back resources for cessation? Not a smoker   Is the patient/caregiver able to teach back the hierarchy of who to call/visit for symptoms/problems? PCP, Specialist, Home health nurse, Urgent Care, ED, 911 Yes   Week 2 call completed? Yes   Wrap up additional comments Pt reports she is doing well.  she denies needs.          HARLEY LEE - Registered Nurse

## 2022-10-25 LAB
QT INTERVAL: 362 MS
QTC INTERVAL: 454 MS

## 2022-11-03 ENCOUNTER — TELEPHONE (OUTPATIENT)
Dept: CARDIOLOGY | Facility: CLINIC | Age: 77
End: 2022-11-03

## 2022-11-03 RX ORDER — CLOPIDOGREL BISULFATE 75 MG/1
75 TABLET ORAL DAILY
Qty: 30 TABLET | Refills: 0 | Status: SHIPPED | OUTPATIENT
Start: 2022-11-03 | End: 2022-11-17 | Stop reason: SDUPTHER

## 2022-11-03 NOTE — TELEPHONE ENCOUNTER
----- Message from Charles Villalta sent at 11/3/2022 11:00 AM CDT -----  Regarding: refill  Shivani Rainesmons is needing a refill of Eliquis and Plavix sent to [facility] in [place]. Thanks

## 2022-11-17 ENCOUNTER — OFFICE VISIT (OUTPATIENT)
Dept: CARDIAC SURGERY | Facility: CLINIC | Age: 77
End: 2022-11-17

## 2022-11-17 VITALS
WEIGHT: 159 LBS | OXYGEN SATURATION: 98 % | BODY MASS INDEX: 25.55 KG/M2 | HEART RATE: 105 BPM | DIASTOLIC BLOOD PRESSURE: 60 MMHG | SYSTOLIC BLOOD PRESSURE: 110 MMHG | TEMPERATURE: 98.5 F | HEIGHT: 66 IN

## 2022-11-17 DIAGNOSIS — Z79.01 CURRENT USE OF LONG TERM ANTICOAGULATION: ICD-10-CM

## 2022-11-17 DIAGNOSIS — I48.91 ATRIAL FIBRILLATION WITH RVR: ICD-10-CM

## 2022-11-17 DIAGNOSIS — I65.23 CAROTID STENOSIS, ASYMPTOMATIC, BILATERAL: ICD-10-CM

## 2022-11-17 DIAGNOSIS — E78.2 MIXED HYPERLIPIDEMIA: ICD-10-CM

## 2022-11-17 DIAGNOSIS — Z48.812 SURGICAL AFTERCARE, CIRCULATORY SYSTEM: Primary | ICD-10-CM

## 2022-11-17 PROCEDURE — 99024 POSTOP FOLLOW-UP VISIT: CPT | Performed by: NURSE PRACTITIONER

## 2022-11-17 RX ORDER — CLOPIDOGREL BISULFATE 75 MG/1
75 TABLET ORAL DAILY
Qty: 30 TABLET | Refills: 11 | Status: SHIPPED | OUTPATIENT
Start: 2022-11-17 | End: 2023-02-28 | Stop reason: SDUPTHER

## 2022-11-17 NOTE — PROGRESS NOTES
Shivani Overton [place]  1945    Chief Complaint:    Chief Complaint   Patient presents with   • Carotid Artery Disease       HPI:      PCP:  Silvana Rangel APRN  Cardiology:  Dr Barnett     77 y.o. female with Hyperlipidemia(stable, increased risk cardiovascular events), Diabetes Mellitus(stable, increased risk cardiovascular events), Obesity(uncontrolled, increased risk cardiovascular events) and Atrial fibrillation(stable, increased risk stroke) , carotid stenosis(new, chronic severe progression, increase risk stroke).  former smoker.  Asymptomatic carotid stenosis.  Afib.  remote TIA 2006. Back trouble, ruptured disk, no operation planned .  No stroke amaurosis.  No MI claudication. No other associated signs, symptoms or modifying factors.    8/2022 Carotid Duplex:  CANDACE >70% (407/149cm/s, ratio 7.4), LICA 50-69% (169/59cm/s, ratio 1.2) antegrade verts.  8/2022 CTA Carotids:  CANDACE subtotal occlusion/string sign, RCCA 30%, RSCA 50%, vert widely patent.  LICA 80% prox, LSCA 30%, vert 60% prox.  10/11/22:  LEFT TCAR  11/2022: Carotid Duplex: LEFT 50-69% (135/57cm/s, ratio 2.3) Antegrade     6/2022 Echocardiogram:  EF 50%, LA 41mm, LV 37mm.  Mild MR, trace TR.  9/2022 ECG:  Afib 102, QTc 404, septal infarct.    The following portions of the patient's history were reviewed and updated as appropriate: allergies, current medications, past family history, past medical history, past social history, past surgical history and problem list.  Recent images independently reviewed.  Available laboratory values reviewed.    PMH:  Past Medical History:   Diagnosis Date   • A-fib (HCC)    • Anemia    • Arthritis    • Carotid stenosis, asymptomatic, bilateral 09/20/2022   • Diabetes mellitus (HCC)    • GERD (gastroesophageal reflux disease)    • History of transfusion    • Hyperlipidemia    • Hypertension    • Renal insufficiency    • TIA (transient ischemic attack)      Past Surgical History:   Procedure Laterality Date   •  COLONOSCOPY N/A 2019    Procedure: COLONOSCOPY;  Surgeon: Lucio Ni DO;  Location: [facility] ENDOSCOPY;  Service: Gastroenterology   • TUBAL ABDOMINAL LIGATION       History reviewed. No pertinent family history.  Social History     Tobacco Use   • Smoking status: Former     Packs/day: 2.00     Types: Cigarettes     Quit date: 2015     Years since quittin.8   • Smokeless tobacco: Never   Vaping Use   • Vaping Use: Never used   Substance Use Topics   • Alcohol use: No   • Drug use: No       ALLERGIES:  Allergies   Allergen Reactions   • Asa [Aspirin] Other (See Comments)     Bleeding          MEDICATIONS:    Current Outpatient Medications:   •  acetaminophen (TYLENOL) 650 MG 8 hr tablet, Take 650 mg by mouth 3 (Three) Times a Day As Needed., Disp: , Rfl:   •  alendronate (FOSAMAX) 70 MG tablet, Take 70 mg by mouth 1 (One) Time Per Week., Disp: , Rfl:   •  apixaban (ELIQUIS) 2.5 MG tablet tablet, Take 1 tablet by mouth 2 (Two) Times a Day., Disp: 60 tablet, Rfl: 0  •  aspirin 81 MG chewable tablet, Chew 1 tablet Daily. Started on 10/5/22 for upcoming surgery, Disp: , Rfl:   •  atorvastatin (LIPITOR) 40 MG tablet, Take 40 mg by mouth Every Night., Disp: , Rfl:   •  calcium citrate-vitamin D (CITRACAL+D) 315-200 MG-UNIT per tablet, Take 1 tablet by mouth 2 (Two) Times a Day., Disp: , Rfl:   •  citalopram (CeleXA) 40 MG tablet, Take 1 tablet by mouth Daily., Disp: , Rfl:   •  clopidogrel (PLAVIX) 75 MG tablet, Take 1 tablet by mouth Daily., Disp: 30 tablet, Rfl: 11  •  dilTIAZem CD (CARDIZEM CD) 180 MG 24 hr capsule, Take 1 capsule by mouth Daily for 90 days., Disp: 90 capsule, Rfl: 2  •  empagliflozin (JARDIANCE) 25 MG tablet tablet, Take 25 mg by mouth Daily., Disp: , Rfl:   •  ferrous gluconate (FERGON) 324 MG tablet, Take 324 mg by mouth Daily With Breakfast., Disp: , Rfl:   •  Lancet Devices (ACCU-CHEK SOFTCLIX) lancets, 1 Units., Disp: , Rfl:   •  latanoprost (XALATAN) 0.005 % ophthalmic  "solution, Administer 1 drop to both eyes Every Night., Disp: , Rfl:   •  metFORMIN (GLUCOPHAGE) 500 MG tablet, Take 1,000 mg by mouth 2 (Two) Times a Day. 2 tabs, Disp: , Rfl:   •  omeprazole (priLOSEC) 20 MG capsule, Take 20 mg by mouth Daily As Needed., Disp: , Rfl:   •  SITagliptin (JANUVIA) 100 MG tablet, Take 100 mg by mouth Daily., Disp: , Rfl:   •  vitamin D3 125 MCG (5000 UT) capsule capsule, Take 1 capsule by mouth Daily., Disp: , Rfl:     Review of Systems   Review of Systems   Constitutional: Negative for malaise/fatigue and weight loss.   Cardiovascular: Positive for irregular heartbeat and palpitations. Negative for chest pain, claudication and dyspnea on exertion.   Respiratory: Negative for cough and shortness of breath.    Skin: Negative for color change and poor wound healing.   Musculoskeletal: Positive for arthritis, back pain and myalgias.   Neurological: Positive for numbness and paresthesias. Negative for dizziness and weakness.     No changes in ROS 11/17/22    Physical Exam   Vitals:    11/17/22 1322   BP: 110/60   BP Location: Left arm   Pulse: 105   Temp: 98.5 °F (36.9 °C)   TempSrc: Temporal   SpO2: 98%   Weight: 72.1 kg (159 lb)   Height: 167.6 cm (66\")     Body surface area is 1.81 meters squared.  Body mass index is 25.66 kg/m².  Physical Exam  Constitutional:       General: She is not in acute distress.     Appearance: She is not ill-appearing.   HENT:      Right Ear: Hearing normal.      Left Ear: Hearing normal.      Nose: No nasal deformity.      Mouth/Throat:      Dentition: Normal dentition. Does not have dentures.   Neck:      Vascular: No carotid bruit.   Cardiovascular:      Rate and Rhythm: Normal rate. Rhythm irregularly irregular.      Pulses:           Carotid pulses are 2+ on the right side with bruit and 2+ on the left side with bruit.       Radial pulses are 2+ on the right side and 2+ on the left side.        Dorsalis pedis pulses are 2+ on the right side and 2+ on the " left side.        Posterior tibial pulses are 1+ on the right side and 1+ on the left side.      Heart sounds: No murmur heard.  Pulmonary:      Effort: Pulmonary effort is normal.      Breath sounds: Normal breath sounds.   Abdominal:      General: There is no distension.      Palpations: Abdomen is soft. There is no mass.      Tenderness: There is no abdominal tenderness.   Musculoskeletal:         General: No deformity.      Cervical back: Normal range of motion.   Skin:     General: Skin is warm and dry.      Capillary Refill: Capillary refill takes less than 2 seconds.      Coloration: Skin is not pale.      Findings: No erythema.   Neurological:      General: No focal deficit present.      Mental Status: She is alert and oriented to person, place, and time. Mental status is at baseline.      Gait: Gait normal.   Psychiatric:         Speech: Speech normal.         Behavior: Behavior is cooperative.         Thought Content: Thought content normal.         Judgment: Judgment normal.         BUN   Date Value Ref Range Status   11/02/2022 19 7 - 25 mg/dL Final     Creatinine   Date Value Ref Range Status   11/02/2022 1.0 0.7 - 1.3 mg/dL Final     eGFR Non  Amer   Date Value Ref Range Status   01/03/2019 51 39 - 90 mL/min/1.73 Final       ASSESSMENT:PLAN  Diagnoses and all orders for this visit:    1. Surgical aftercare, circulatory system  Patent LEFT Carotid Stent  Continue Eliquis 2.5 BID  If you should experience any neurological symptoms including but not limited to visual or speech disturbances confusion, seizures, or weakness of limbs of one side of your body notify Heart and Vascular center immediately for evaluation or if after hours present to the nearest Emergency Department.   Follow up 3 mos    2. Carotid stenosis, asymptomatic, bilateral  Calcified Plaque 50% per duplex  Medical Management: Aspirin, Continue Plavix, Statin     - clopidogrel (PLAVIX) 75 MG tablet; Take 1 tablet by mouth Daily.   Dispense: 30 tablet; Refill: 11  - Duplex Carotid Ultrasound CAR; Future    3. Atrial fibrillation with RVR (HCC)  Rate controlled    4. Mixed hyperlipidemia  Lipid-lowering therapy has been proven beneficial in patients with cardio-vascular disease. Current guidelines recommend statin treatment for all patients with PAD,CAD and carotid stenosis. Statins are beneficial in preventing cardiovascular events, increasing functional capacity and lower the risk of adverse limb loss in PAD. Statins decrease the progression of plaque formation and may improve peripheral vessel lining, and aid in reversing atherosclerosis.       5. Current use of long term anticoagulation  Notify provider if you experience excessive bleeding from the nose, cuts, gums, rectum, urinary tract, or vagina. Reddish or brown urine or stool. Vomiting of blood or hemorrhoidal bleeding. If major injury occurs present to the Emergency Department.         Advance Care Planning discussed and  Informational packet given to patient. Advance Care Plan on file: No            This document has been electronically signed by INDERJIT Israel on November 17, 2022 14:07 CST

## 2022-11-17 NOTE — PATIENT INSTRUCTIONS
Patent LEFT Carotid Stent  Calcified Plaque 50% per duplex  Medical Management: Aspirin, Continue Plavix, Statin   Continue Eliquis 2.5 BID  If you should experience any neurological symptoms including but not limited to visual or speech disturbances confusion, seizures, or weakness of limbs of one side of your body notify Heart and Vascular center immediately for evaluation or if after hours present to the nearest Emergency Department.   Follow up 3 mos

## 2023-02-22 ENCOUNTER — OFFICE VISIT (OUTPATIENT)
Dept: CARDIAC SURGERY | Facility: CLINIC | Age: 78
End: 2023-02-22
Payer: MEDICARE

## 2023-02-22 VITALS
WEIGHT: 159 LBS | DIASTOLIC BLOOD PRESSURE: 69 MMHG | SYSTOLIC BLOOD PRESSURE: 122 MMHG | BODY MASS INDEX: 25.55 KG/M2 | HEART RATE: 112 BPM | HEIGHT: 66 IN | OXYGEN SATURATION: 98 %

## 2023-02-22 DIAGNOSIS — Z79.01 CURRENT USE OF LONG TERM ANTICOAGULATION: ICD-10-CM

## 2023-02-22 DIAGNOSIS — E11.42 CONTROLLED TYPE 2 DIABETES MELLITUS WITH DIABETIC POLYNEUROPATHY, WITHOUT LONG-TERM CURRENT USE OF INSULIN: ICD-10-CM

## 2023-02-22 DIAGNOSIS — I48.91 ATRIAL FIBRILLATION WITH RVR: ICD-10-CM

## 2023-02-22 DIAGNOSIS — I65.23 CAROTID STENOSIS, ASYMPTOMATIC, BILATERAL: Primary | ICD-10-CM

## 2023-02-22 DIAGNOSIS — E78.2 MIXED HYPERLIPIDEMIA: ICD-10-CM

## 2023-02-22 PROCEDURE — 99214 OFFICE O/P EST MOD 30 MIN: CPT | Performed by: NURSE PRACTITIONER

## 2023-02-22 RX ORDER — ATORVASTATIN CALCIUM 80 MG/1
80 TABLET, FILM COATED ORAL DAILY
Qty: 90 TABLET | Refills: 3 | Status: SHIPPED | OUTPATIENT
Start: 2023-02-22

## 2023-02-22 NOTE — PATIENT INSTRUCTIONS
moderate Carotid Artery Stenosis left worsened Asymptomatic   LEFT stent with plaque  Medical Management: ASA,PLAVIX,STATIN, Eliquis   Maximize statin  Diet modifications  If you should experience any neurological symptoms including but not limited to visual or speech disturbances confusion, seizures, or weakness of limbs of one side of your body notify Heart and Vascular center immediately for evaluation or if after hours present to the nearest Emergency Department.    Return 6 mos

## 2023-02-22 NOTE — PROGRESS NOTES
Shivani Overton [place]  1945    Chief Complaint:    Chief Complaint   Patient presents with   • Carotid Artery Disease       HPI:      PCP:  Silvana Rangel APRN  Cardiology:  Dr Barnett     78 y.o. female with Hyperlipidemia(stable, increased risk cardiovascular events), Diabetes Mellitus(stable, increased risk cardiovascular events), Obesity(uncontrolled, increased risk cardiovascular events) and Atrial fibrillation(stable, increased risk stroke) , carotid stenosis(new, chronic severe progression, increase risk stroke).  former smoker.  Asymptomatic carotid stenosis.  Afib.  remote TIA 2006. Back trouble, ruptured disk, no operation planned .  No stroke amaurosis.  No MI claudication. No other associated signs, symptoms or modifying factors.    8/2022 Carotid Duplex:  CANDACE >70% (407/149cm/s, ratio 7.4), LICA 50-69% (169/59cm/s, ratio 1.2) antegrade verts.  8/2022 CTA Carotids:  CANDACE subtotal occlusion/string sign, RCCA 30%, RSCA 50%, vert widely patent.  LICA 80% prox, LSCA 30%, vert 60% prox.  10/11/22:  LEFT TCAR  11/2022: Carotid Duplex: LEFT 50-69% (135/57cm/s, ratio 2.3) Antegrade   2/2022: Carotid Duplex: RIGHT Occluded subtotal LEFT 50-69% (117/36cm/s, ratio) Antegrade LICA Stent calcified plaque    6/2022 Echocardiogram:  EF 50%, LA 41mm, LV 37mm.  Mild MR, trace TR.  9/2022 ECG:  Afib 102, QTc 404, septal infarct.    The following portions of the patient's history were reviewed and updated as appropriate: allergies, current medications, past family history, past medical history, past social history, past surgical history and problem list.  Recent images independently reviewed.  Available laboratory values reviewed.    PMH:  Past Medical History:   Diagnosis Date   • A-fib (HCC)    • Anemia    • Arthritis    • Carotid stenosis, asymptomatic, bilateral 09/20/2022   • Diabetes mellitus (HCC)    • GERD (gastroesophageal reflux disease)    • History of transfusion    • Hyperlipidemia    • Hypertension    •  Renal insufficiency    • TIA (transient ischemic attack)      Past Surgical History:   Procedure Laterality Date   • COLONOSCOPY N/A 2019    Procedure: COLONOSCOPY;  Surgeon: Lucio Ni DO;  Location: [facility] ENDOSCOPY;  Service: Gastroenterology   • TUBAL ABDOMINAL LIGATION       History reviewed. No pertinent family history.  Social History     Tobacco Use   • Smoking status: Former     Packs/day: 2.00     Types: Cigarettes     Quit date: 2015     Years since quittin.1     Passive exposure: Past   • Smokeless tobacco: Never   Vaping Use   • Vaping Use: Never used   Substance Use Topics   • Alcohol use: No   • Drug use: No       ALLERGIES:  Allergies   Allergen Reactions   • Asa [Aspirin] Other (See Comments)     Bleeding          MEDICATIONS:    Current Outpatient Medications:   •  acetaminophen (TYLENOL) 650 MG 8 hr tablet, Take 650 mg by mouth 3 (Three) Times a Day As Needed., Disp: , Rfl:   •  alendronate (FOSAMAX) 70 MG tablet, Take 70 mg by mouth 1 (One) Time Per Week., Disp: , Rfl:   •  apixaban (ELIQUIS) 2.5 MG tablet tablet, Take 1 tablet by mouth 2 (Two) Times a Day., Disp: 60 tablet, Rfl: 0  •  aspirin 81 MG chewable tablet, Chew 1 tablet Daily. Started on 10/5/22 for upcoming surgery, Disp: , Rfl:   •  calcium citrate-vitamin D (CITRACAL+D) 315-200 MG-UNIT per tablet, Take 1 tablet by mouth 2 (Two) Times a Day., Disp: , Rfl:   •  citalopram (CeleXA) 40 MG tablet, Take 1 tablet by mouth Daily., Disp: , Rfl:   •  clopidogrel (PLAVIX) 75 MG tablet, Take 1 tablet by mouth Daily., Disp: 30 tablet, Rfl: 11  •  empagliflozin (JARDIANCE) 25 MG tablet tablet, Take 25 mg by mouth Daily., Disp: , Rfl:   •  ferrous gluconate (FERGON) 324 MG tablet, Take 324 mg by mouth Daily With Breakfast., Disp: , Rfl:   •  Lancet Devices (ACCU-CHEK SOFTCLIX) lancets, 1 Units., Disp: , Rfl:   •  latanoprost (XALATAN) 0.005 % ophthalmic solution, Administer 1 drop to both eyes Every Night., Disp: , Rfl:   •   "metFORMIN (GLUCOPHAGE) 500 MG tablet, Take 1,000 mg by mouth 2 (Two) Times a Day. 2 tabs, Disp: , Rfl:   •  omeprazole (priLOSEC) 20 MG capsule, Take 20 mg by mouth Daily As Needed., Disp: , Rfl:   •  SITagliptin (JANUVIA) 100 MG tablet, Take 100 mg by mouth Daily., Disp: , Rfl:   •  vitamin D3 125 MCG (5000 UT) capsule capsule, Take 1 capsule by mouth Daily., Disp: , Rfl:   •  atorvastatin (Lipitor) 80 MG tablet, Take 1 tablet by mouth Daily., Disp: 90 tablet, Rfl: 3  •  dilTIAZem CD (CARDIZEM CD) 180 MG 24 hr capsule, Take 1 capsule by mouth Daily for 90 days., Disp: 90 capsule, Rfl: 2    Review of Systems   Review of Systems   Constitutional: Negative for malaise/fatigue and weight loss.   Cardiovascular: Positive for irregular heartbeat and palpitations. Negative for chest pain, claudication and dyspnea on exertion.   Respiratory: Negative for cough and shortness of breath.    Skin: Negative for color change and poor wound healing.   Musculoskeletal: Positive for arthritis, back pain and myalgias.   Neurological: Positive for numbness and paresthesias. Negative for dizziness and weakness.     No changes in ROS 02/22/23    Physical Exam   Vitals:    02/22/23 1343   BP: 122/69   BP Location: Left arm   Patient Position: Sitting   Cuff Size: Adult   Pulse: 112   SpO2: 98%   Weight: 72.1 kg (159 lb)   Height: 167.6 cm (66\")     Body surface area is 1.81 meters squared.  Body mass index is 25.66 kg/m².  Physical Exam  Constitutional:       General: She is not in acute distress.     Appearance: She is not ill-appearing.   HENT:      Right Ear: Hearing normal.      Left Ear: Hearing normal.      Nose: No nasal deformity.      Mouth/Throat:      Dentition: Normal dentition. Does not have dentures.   Neck:      Vascular: Carotid bruit present.   Cardiovascular:      Rate and Rhythm: Normal rate. Rhythm irregularly irregular.      Pulses:           Carotid pulses are 2+ on the right side with bruit and 2+ on the left side " with bruit.       Radial pulses are 2+ on the right side and 2+ on the left side.        Dorsalis pedis pulses are 2+ on the right side and 2+ on the left side.        Posterior tibial pulses are 1+ on the right side and 1+ on the left side.      Heart sounds: No murmur heard.  Pulmonary:      Effort: Pulmonary effort is normal.      Breath sounds: Normal breath sounds.   Abdominal:      General: There is no distension.      Palpations: Abdomen is soft. There is no mass.      Tenderness: There is no abdominal tenderness.   Musculoskeletal:         General: No deformity.      Cervical back: Normal range of motion.   Skin:     General: Skin is warm and dry.      Capillary Refill: Capillary refill takes less than 2 seconds.      Coloration: Skin is not pale.      Findings: No erythema.   Neurological:      General: No focal deficit present.      Mental Status: She is alert and oriented to person, place, and time. Mental status is at baseline.      Gait: Gait normal.   Psychiatric:         Speech: Speech normal.         Behavior: Behavior is cooperative.         Thought Content: Thought content normal.         Judgment: Judgment normal.         BUN   Date Value Ref Range Status   11/02/2022 19 7 - 25 mg/dL Final     Creatinine   Date Value Ref Range Status   11/02/2022 1.0 0.7 - 1.3 mg/dL Final     eGFR Non  Amer   Date Value Ref Range Status   01/03/2019 51 39 - 90 mL/min/1.73 Final       ASSESSMENT:PLAN  Diagnoses and all orders for this visit:    1. Carotid stenosis, asymptomatic, bilateral  moderate Carotid Artery Stenosis left worsened Asymptomatic   LEFT stent with plaque  Medical Management: ASA,PLAVIX,STATIN, Eliquis   Maximize statin  Diet modifications  If you should experience any neurological symptoms including but not limited to visual or speech disturbances confusion, seizures, or weakness of limbs of one side of your body notify Heart and Vascular center immediately for evaluation or if after hours  present to the nearest Emergency Department.    Return 6 mos   - Duplex Carotid Ultrasound CAR; Future    2. Atrial fibrillation with RVR (HCC)  Rate controlled    3. Mixed hyperlipidemia  Lipid-lowering therapy has been proven beneficial in patients with cardio-vascular disease. Current guidelines recommend statin treatment for all patients with PAD,CAD and carotid stenosis. Statins are beneficial in preventing cardiovascular events, increasing functional capacity and lower the risk of adverse limb loss in PAD. Statins decrease the progression of plaque formation and may improve peripheral vessel lining, and aid in reversing atherosclerosis.     - atorvastatin (Lipitor) 80 MG tablet; Take 1 tablet by mouth Daily.  Dispense: 90 tablet; Refill: 3    4. Current use of long term anticoagulation  Eliquis  Notify provider if you experience excessive bleeding from the nose, cuts, gums, rectum, urinary tract, or vagina. Reddish or brown urine or stool. Vomiting of blood or hemorrhoidal bleeding. If major injury occurs present to the Emergency Department.      5. Controlled type 2 diabetes mellitus with diabetic polyneuropathy, without long-term current use of insulin (HCC)  Increased risk for worsening PAD, CAD, stent closure, and progressive carotid disease with uncontrolled DM.   Lab Results   Component Value Date    HGBA1C 7.6 (H) 11/02/2022     Medical management: Diet. Oral Medications. Insulin. Other injectables.         Advance Care Planning discussed and  Informational packet given to patient. Advance Care Plan on file: No    Time spent 30 minutes in face to face evaluation, reviewing of medical history, tests, and procedures. Independent interpretation of vascular studies, ordering additional tests, documentation, and coordination of care.   Counseling and education with the patient and family regarding treatment options, plan of care, and hoped for outcomes. All questions answered.           This document has  been electronically signed by INDERJIT Israel on February 22, 2023 15:30 CST

## 2023-02-28 DIAGNOSIS — I65.23 CAROTID STENOSIS, ASYMPTOMATIC, BILATERAL: ICD-10-CM

## 2023-03-01 RX ORDER — CLOPIDOGREL BISULFATE 75 MG/1
75 TABLET ORAL DAILY
Qty: 90 TABLET | Refills: 3 | Status: SHIPPED | OUTPATIENT
Start: 2023-03-01

## 2023-05-22 RX ORDER — ATORVASTATIN CALCIUM 40 MG/1
TABLET, FILM COATED ORAL
Qty: 90 TABLET | Refills: 3 | OUTPATIENT
Start: 2023-05-22

## 2023-08-17 DIAGNOSIS — E78.2 MIXED HYPERLIPIDEMIA: ICD-10-CM

## 2023-08-17 RX ORDER — ATORVASTATIN CALCIUM 40 MG/1
TABLET, FILM COATED ORAL
Qty: 90 TABLET | Refills: 3 | OUTPATIENT
Start: 2023-08-17

## 2023-08-24 ENCOUNTER — OFFICE VISIT (OUTPATIENT)
Dept: CARDIAC SURGERY | Facility: CLINIC | Age: 78
End: 2023-08-24
Payer: MEDICARE

## 2023-08-24 VITALS
HEART RATE: 100 BPM | BODY MASS INDEX: 24.11 KG/M2 | SYSTOLIC BLOOD PRESSURE: 118 MMHG | HEIGHT: 66 IN | WEIGHT: 150 LBS | OXYGEN SATURATION: 98 % | DIASTOLIC BLOOD PRESSURE: 74 MMHG

## 2023-08-24 DIAGNOSIS — Z79.01 CURRENT USE OF LONG TERM ANTICOAGULATION: ICD-10-CM

## 2023-08-24 DIAGNOSIS — I65.23 CAROTID STENOSIS, ASYMPTOMATIC, BILATERAL: Primary | ICD-10-CM

## 2023-08-24 DIAGNOSIS — I48.91 ATRIAL FIBRILLATION WITH RVR: ICD-10-CM

## 2023-08-24 DIAGNOSIS — E78.2 MIXED HYPERLIPIDEMIA: ICD-10-CM

## 2023-08-24 RX ORDER — SEMAGLUTIDE 1.34 MG/ML
1 INJECTION, SOLUTION SUBCUTANEOUS
COMMUNITY
Start: 2023-08-08

## 2023-08-24 NOTE — PROGRESS NOTES
Shivani Overton Lenny  1945    Chief Complaint:    No chief complaint on file.      HPI:      PCP:  Silvana Rangel APRN  Cardiology:  Dr Barnett     78 y.o. female with Hyperlipidemia(stable, increased risk cardiovascular events), Diabetes Mellitus(stable, increased risk cardiovascular events), Obesity(uncontrolled, increased risk cardiovascular events) and Atrial fibrillation(stable, increased risk stroke) , carotid stenosis(new, chronic severe progression, increase risk stroke).  former smoker.  Asymptomatic carotid stenosis.  Afib.  remote TIA 2006. Back trouble, ruptured disk, no operation planned .  No stroke amaurosis.  No MI claudication. No other associated signs, symptoms or modifying factors.    8/2022 Carotid Duplex:  CANDACE >70% (407/149cm/s, ratio 7.4), LICA 50-69% (169/59cm/s, ratio 1.2) antegrade verts.  8/2022 CTA Carotids:  CANDACE subtotal occlusion/string sign, RCCA 30%, RSCA 50%, vert widely patent.  LICA 80% prox, LSCA 30%, vert 60% prox.  10/11/22:  LEFT TCAR  11/2022: Carotid Duplex: LEFT 50-69% (135/57cm/s, ratio 2.3) Antegrade   2/2022: Carotid Duplex: RIGHT Occluded subtotal LEFT 50-69% (117/36cm/s, ratio) Antegrade LICA Stent calcified plaque  8/2023: Carotid Duplex: RIGHT near Occluded  LEFT 50-69% (135/45cm/s, ratio 1.7) Antegrade Patent LEFT stent. Plaque.      6/2022 Echocardiogram:  EF 50%, LA 41mm, LV 37mm.  Mild MR, trace TR.  9/2022 ECG:  Afib 102, QTc 404, septal infarct.    The following portions of the patient's history were reviewed and updated as appropriate: allergies, current medications, past family history, past medical history, past social history, past surgical history and problem list.  Recent images independently reviewed.  Available laboratory values reviewed.    PMH:  Past Medical History:   Diagnosis Date    A-fib     Anemia     Arthritis     Carotid stenosis, asymptomatic, bilateral 09/20/2022    Diabetes mellitus     GERD (gastroesophageal reflux disease)     History  of transfusion     Hyperlipidemia     Hypertension     Renal insufficiency     TIA (transient ischemic attack)      Past Surgical History:   Procedure Laterality Date    COLONOSCOPY N/A 2019    Procedure: COLONOSCOPY;  Surgeon: Lucio Ni DO;  Location: [facility] ENDOSCOPY;  Service: Gastroenterology    TUBAL ABDOMINAL LIGATION       History reviewed. No pertinent family history.  Social History     Tobacco Use    Smoking status: Former     Packs/day: 2.00     Types: Cigarettes     Quit date: 2015     Years since quittin.6     Passive exposure: Past    Smokeless tobacco: Never   Vaping Use    Vaping Use: Never used   Substance Use Topics    Alcohol use: No    Drug use: No       ALLERGIES:  Allergies   Allergen Reactions    Asa [Aspirin] Other (See Comments)     Bleeding          MEDICATIONS:    Current Outpatient Medications:     acetaminophen (TYLENOL) 650 MG 8 hr tablet, Take 1 tablet by mouth 3 (Three) Times a Day As Needed., Disp: , Rfl:     alendronate (FOSAMAX) 70 MG tablet, Take 1 tablet by mouth 1 (One) Time Per Week., Disp: , Rfl:     apixaban (ELIQUIS) 2.5 MG tablet tablet, Take 1 tablet by mouth 2 (Two) Times a Day., Disp: 60 tablet, Rfl: 0    aspirin 81 MG chewable tablet, Chew 1 tablet Daily. Started on 10/5/22 for upcoming surgery, Disp: , Rfl:     atorvastatin (Lipitor) 80 MG tablet, Take 1 tablet by mouth Daily., Disp: 90 tablet, Rfl: 3    calcium citrate-vitamin D (CITRACAL+D) 315-200 MG-UNIT per tablet, Take 1 tablet by mouth 2 (Two) Times a Day., Disp: , Rfl:     citalopram (CeleXA) 40 MG tablet, Take 1 tablet by mouth Daily., Disp: , Rfl:     clopidogrel (PLAVIX) 75 MG tablet, Take 1 tablet by mouth Daily., Disp: 90 tablet, Rfl: 3    empagliflozin (JARDIANCE) 25 MG tablet tablet, Take 1 tablet by mouth Daily., Disp: , Rfl:     ferrous gluconate (FERGON) 324 MG tablet, Take 1 tablet by mouth Daily With Breakfast., Disp: , Rfl:     Lancet Devices (ACCU-CHEK SOFTCLIX) lancets, 1  "Units., Disp: , Rfl:     latanoprost (XALATAN) 0.005 % ophthalmic solution, Administer 1 drop to both eyes Every Night., Disp: , Rfl:     metFORMIN (GLUCOPHAGE) 500 MG tablet, Take 2 tablets by mouth 2 (Two) Times a Day. 2 tabs, Disp: , Rfl:     omeprazole (priLOSEC) 20 MG capsule, Take 1 capsule by mouth Daily As Needed., Disp: , Rfl:     Ozempic, 1 MG/DOSE, 4 MG/3ML solution pen-injector, Inject 1 mg under the skin into the appropriate area as directed., Disp: , Rfl:     SITagliptin (JANUVIA) 100 MG tablet, Take 1 tablet by mouth Daily., Disp: , Rfl:     vitamin D3 125 MCG (5000 UT) capsule capsule, Take 1 capsule by mouth Daily., Disp: , Rfl:     dilTIAZem CD (CARDIZEM CD) 180 MG 24 hr capsule, Take 1 capsule by mouth Daily for 90 days., Disp: 90 capsule, Rfl: 2    Review of Systems   Review of Systems   Constitutional: Negative for malaise/fatigue and weight loss.   Cardiovascular:  Positive for irregular heartbeat and palpitations. Negative for chest pain, claudication and dyspnea on exertion.   Respiratory:  Negative for cough and shortness of breath.    Skin:  Negative for color change and poor wound healing.   Musculoskeletal:  Positive for arthritis, back pain and myalgias.   Neurological:  Positive for numbness and paresthesias. Negative for dizziness and weakness.   No changes in ROS 08/24/23    Physical Exam   Vitals:    08/24/23 1325   BP: 118/74   BP Location: Left arm   Pulse: 100   SpO2: 98%   Weight: 68 kg (150 lb)   Height: 167.6 cm (66\")     Body surface area is 1.77 meters squared.  Body mass index is 24.21 kg/mý.  Physical Exam  Constitutional:       General: She is not in acute distress.     Appearance: She is not ill-appearing.   HENT:      Right Ear: Hearing normal.      Left Ear: Hearing normal.      Nose: No nasal deformity.      Mouth/Throat:      Dentition: Normal dentition. Does not have dentures.   Neck:      Vascular: Carotid bruit present.   Cardiovascular:      Rate and Rhythm: " Normal rate. Rhythm irregularly irregular.      Pulses:           Carotid pulses are 2+ on the right side with bruit and 2+ on the left side with bruit.       Radial pulses are 2+ on the right side and 2+ on the left side.        Dorsalis pedis pulses are 2+ on the right side and 2+ on the left side.        Posterior tibial pulses are 1+ on the right side and 1+ on the left side.      Heart sounds: No murmur heard.  Pulmonary:      Effort: Pulmonary effort is normal.      Breath sounds: Normal breath sounds.   Abdominal:      General: There is no distension.      Palpations: Abdomen is soft. There is no mass.      Tenderness: There is no abdominal tenderness.   Musculoskeletal:         General: No deformity.      Cervical back: Normal range of motion.   Skin:     General: Skin is warm and dry.      Capillary Refill: Capillary refill takes less than 2 seconds.      Coloration: Skin is not pale.      Findings: No erythema.   Neurological:      General: No focal deficit present.      Mental Status: She is alert and oriented to person, place, and time. Mental status is at baseline.      Gait: Gait normal.   Psychiatric:         Speech: Speech normal.         Behavior: Behavior is cooperative.         Thought Content: Thought content normal.         Judgment: Judgment normal.       BUN   Date Value Ref Range Status   08/07/2023 19 7 - 25 mg/dL Final     Creatinine   Date Value Ref Range Status   08/07/2023 1.0 0.7 - 1.3 mg/dL Final     eGFR Non  Amer   Date Value Ref Range Status   01/03/2019 51 39 - 90 mL/min/1.73 Final       ASSESSMENT:PLAN  Diagnoses and all orders for this visit:    1. Carotid stenosis, asymptomatic, bilateral  moderate Carotid Artery Stenosis left remained stable Asymptomatic   Patent Stent  Medical Management: ASA,PLAVIX,STATIN   If you should experience any neurological symptoms including but not limited to visual or speech disturbances confusion, seizures, or weakness of limbs of one side  of your body notify Heart and Vascular center immediately for evaluation or if after hours present to the nearest Emergency Department.    Return 1 yr - CAROTID  - Duplex Carotid - Left Ultrasound CAR; Future    2. Mixed hyperlipidemia  Lipid-lowering therapy has been proven beneficial in patients with cardio-vascular disease. Current guidelines recommend statin treatment for all patients with PAD,CAD and carotid stenosis. Statins are beneficial in preventing cardiovascular events, increasing functional capacity and lower the risk of adverse limb loss in PAD. Statins decrease the progression of plaque formation and may improve peripheral vessel lining, and aid in reversing atherosclerosis.     3. Atrial fibrillation with RVR  Rate controlled    4. Current use of long term anticoagulation  Eliquis  Notify provider if you experience excessive bleeding from the nose, cuts, gums, rectum, urinary tract, or vagina. Reddish or brown urine or stool. Vomiting of blood or hemorrhoidal bleeding. If major injury occurs present to the Emergency Department.         Advance Care Planning discussed and  Informational packet given to patient. Advance Care Plan on file: No    Time spent 30 minutes in face to face evaluation, reviewing of medical history, tests, and procedures. Independent interpretation of vascular studies, ordering additional tests, documentation, and coordination of care.   Counseling and education with the patient and family regarding treatment options, plan of care, and hoped for outcomes. All questions answered.         This document has been electronically signed by INDERJIT Israel on August 24, 2023 14:03 CDT

## 2023-08-24 NOTE — PATIENT INSTRUCTIONS
moderate Carotid Artery Stenosis left remained stable Asymptomatic   Patent Stent  Medical Management: ASA,PLAVIX,STATIN   If you should experience any neurological symptoms including but not limited to visual or speech disturbances confusion, seizures, or weakness of limbs of one side of your body notify Heart and Vascular center immediately for evaluation or if after hours present to the nearest Emergency Department.    Return 1 yr - CAROTID

## (undated) DEVICE — SUT VICRYL 3-0 SH-1 PO 18IN J772D

## (undated) DEVICE — SUT SILK 0 TIES 18IN A186H BX36

## (undated) DEVICE — DRSNG TELFA PAD NONADH STR 1S 3X4IN

## (undated) DEVICE — STERILE POLYISOPRENE POWDER-FREE SURGICAL GLOVES WITH EMOLLIENT COATING: Brand: PROTEXIS

## (undated) DEVICE — SUT MONOCRYL 4/0 PS2 27IN Y426H ETY426H

## (undated) DEVICE — SUT PROLN 5/0 C1 D/A 36IN 8720H

## (undated) DEVICE — PENCL ES MEGADINE EZ/CLEAN BUTN W/HOLSTR 10FT

## (undated) DEVICE — SUT SILK 2/0 FS BLK 18IN 685G

## (undated) DEVICE — PK VASC LF 60

## (undated) DEVICE — SUT PROLN CARDIO BV1 6/0 24IN 8805H

## (undated) DEVICE — ANGLED-TIP ARTERIAL SHEATH CONFIGURATION: Brand: ENROUTE TRANSCAROTID NEUROPROTECTION SYSTEM

## (undated) DEVICE — FOGARTY - HYDRAGRIP SURGICAL - CLAMP INSERTS: Brand: FOGARTY SOFTJAW

## (undated) DEVICE — PATIENT RETURN ELECTRODE, SINGLE-USE, CONTACT QUALITY MONITORING, ADULT, WITH 9FT CORD, FOR PATIENTS WEIGING OVER 33LBS. (15KG): Brand: MEGADYNE

## (undated) DEVICE — SINGLE-USE BIOPSY FORCEPS: Brand: RADIAL JAW 4

## (undated) DEVICE — RADIFOCUS GLIDEWIRE: Brand: GLIDEWIRE

## (undated) DEVICE — ELECTRD BLD EZ CLN MOD XLNG 2.75IN

## (undated) DEVICE — PINNACLE INTRODUCER SHEATH: Brand: PINNACLE

## (undated) DEVICE — SPNG LAP 18X18IN LF STRL PK/5

## (undated) DEVICE — TOTAL TRAY, 16FR 10ML SIL FOLEY, URN: Brand: MEDLINE

## (undated) DEVICE — PTA BALLOON DILATATION CATHETER: Brand: STERLING™

## (undated) DEVICE — STERILE POLYISOPRENE POWDER-FREE SURGICAL GLOVES: Brand: PROTEXIS

## (undated) DEVICE — 3M™ IOBAN™ 2 ANTIMICROBIAL INCISE DRAPE 6651EZ: Brand: IOBAN™ 2

## (undated) DEVICE — DECANTER BAG 9": Brand: MEDLINE INDUSTRIES, INC.

## (undated) DEVICE — GLV RAD REDUC ESP SZ8

## (undated) DEVICE — SUT PROLN 7/0 BV1 D/A 24IN 8702H

## (undated) DEVICE — TBG PENCL TELESCP MEGADYNE SMOKE EVAC 15FT

## (undated) DEVICE — KT LINEN QUICKSQUITE SAHARA STD DRW/LIFT 40X90IN

## (undated) DEVICE — SUT PROLENE CARDIO C1D 6/0 24IN 8726H

## (undated) DEVICE — KT INTRO MINISTICK MAX W/GW PALLADIUM ECHO 4F 21G 7CM

## (undated) DEVICE — TOWEL,OR,DSP,ST,BLUE,DLX,4/PK,20PK/CS: Brand: MEDLINE

## (undated) DEVICE — PROXIMATE SKIN STAPLERS (35 WIDE) CONTAINS 35 STAINLESS STEEL STAPLES (FIXED HEAD): Brand: PROXIMATE

## (undated) DEVICE — SYR SLP TP 10ML DISP

## (undated) DEVICE — ST CVR PROB PULLUP ULTRASND 5X48IN

## (undated) DEVICE — CANN SMPL SOFTECH BIFLO ETCO2 A/M 7FT

## (undated) DEVICE — PACK,UNIVERSAL,NO GOWNS: Brand: MEDLINE

## (undated) DEVICE — GW ENROUTE HC .014IN 95CM

## (undated) DEVICE — PRESTO™ INFLATION DEVICE: Brand: PRESTO

## (undated) DEVICE — KT INTRO MIC TROC 4F 21GA 7CM

## (undated) DEVICE — DRSNG SURESITE WNDW 4X4.5

## (undated) DEVICE — ADHS SKIN PREMIERPRO EXOFIN TOPICAL HI/VISC .5ML